# Patient Record
Sex: MALE | Race: OTHER
[De-identification: names, ages, dates, MRNs, and addresses within clinical notes are randomized per-mention and may not be internally consistent; named-entity substitution may affect disease eponyms.]

---

## 2019-09-08 ENCOUNTER — HOSPITAL ENCOUNTER (EMERGENCY)
Dept: HOSPITAL 61 - ER | Age: 39
Discharge: HOME | End: 2019-09-08
Payer: SELF-PAY

## 2019-09-08 VITALS — HEIGHT: 66 IN | BODY MASS INDEX: 24.11 KG/M2 | WEIGHT: 150 LBS

## 2019-09-08 VITALS — SYSTOLIC BLOOD PRESSURE: 142 MMHG | DIASTOLIC BLOOD PRESSURE: 93 MMHG

## 2019-09-08 DIAGNOSIS — F10.20: ICD-10-CM
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
## 2019-09-08 DIAGNOSIS — S62.114A: Primary | ICD-10-CM
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
## 2019-09-08 PROCEDURE — 73110 X-RAY EXAM OF WRIST: CPT

## 2019-09-08 PROCEDURE — 99284 EMERGENCY DEPT VISIT MOD MDM: CPT

## 2019-09-08 PROCEDURE — 29125 APPL SHORT ARM SPLINT STATIC: CPT

## 2019-09-08 NOTE — PHYS DOC
Past Medical History


Past Medical History:  No Pertinent History


Past Surgical History:  No Surgical History


Alcohol Use:  Heavy


Drug Use:  None





Adult General


Chief Complaint


Chief Complaint:  HAND PROBLEM





HPI


HPI





Patient is a 39  year old male that presents with right wrist pain has been 

ongoing since Friday. The patient states that on Friday he slammed his wrist in 

a door. Pain is 5 out of 10 in severity.





Review of Systems


Review of Systems





Constitutional: Denies fever or chills []


Eyes: Denies change in visual acuity, redness, or eye pain []


HENT: Denies nasal congestion or sore throat []


Respiratory: Denies cough or shortness of breath []


Cardiovascular: No additional information not addressed in HPI []


GI: Denies abdominal pain, nausea, vomiting, bloody stools or diarrhea []


: Denies dysuria or hematuria []


Musculoskeletal: Reports R wrist pain.


Integument: Denies rash or skin lesions []


Neurologic: Denies headache, focal weakness or sensory changes []


Endocrine: Denies polyuria or polydipsia []





Complete systems were reviewed and found to be within normal limits, except as 

documented in this note.





Allergies


Allergies





Allergies








Coded Allergies Type Severity Reaction Last Updated Verified


 


  No Known Drug Allergies    16 No











Physical Exam


Physical Exam





Constitutional: Well developed, well nourished, no acute distress, non-toxic a

ppearance. []


HENT: Normocephalic, atraumatic, bilateral external ears normal, oropharynx 

moist, no oral exudates, nose normal. []


Eyes: PERRLA, EOMI, conjunctiva normal, no discharge. [] 


Neck: Normal range of motion, no tenderness, supple, no stridor. [] 


Cardiovascular:Heart rate regular rhythm, no murmur []


Lungs & Thorax:  Bilateral breath sounds clear to auscultation []


Abdomen: Bowel sounds normal, soft, no tenderness, no masses, no pulsatile 

masses. [] 


Skin: Warm, dry, no erythema, no rash. [] 


Back: No tenderness, no CVA tenderness. [] 


Extremities: Edema and tenderness to R wrist. Has snuff box tenderness.


Neurologic: Alert and oriented X 3, normal motor function, normal sensory 

function, no focal deficits noted. []


Psychologic: Affect normal, judgement normal, mood normal. []





Current Patient Data


Vital Signs





                                   Vital Signs








  Date Time  Temp Pulse Resp B/P (MAP) Pulse Ox O2 Delivery O2 Flow Rate FiO2


 


19 17:00 99.1 63 16 142/93 (109) 96 Room Air  





 99.1       











EKG


EKG


[]





Radiology/Procedures


Radiology/Procedures


[][facility]


                    [address] [place] [address]


                                 [phone]


                                        


                                 IMAGING REPORT





                                     Signed





PATIENT: KAT COOPERACCOUNT: [id number]     MRN#: [id number]


: 1980           LOCATION: ER              AGE: 39


SEX: M                    EXAM DT: 19         ACCESSION#: [id number]


STATUS: REG ER            ORD. PHYSICIAN: KAILYN LAFLEUR


REASON: wrist trauma, PT INDICATED PAIN LOCATION IS MEADIAL ULNAR WRIST PAIN


PROCEDURE: WRIST 3V RIGHT





Exam: Right wrist 3 views


 


INDICATION: Trauma


 


TECHNIQUE: Frontal, lateral and oblique views of the right wrist


 


Comparisons: None


 


FINDINGS:


Mildly displaced ossific density in the posterior carpus seen best on 


lateral view. Subtle linear lucency at the ulnar styloid. Joint spaces are


well-maintained. Soft tissues are unremarkable. Bone mineralization is 


normal.


 


IMPRESSION:


1.  Findings likely representing minimally displaced triquetral fracture


2.  Subtle linear lucency at the ulnar styloid may represent nondisplaced 


fracture. Correlate with point tenderness.


 


Electronically signed by: Jag Cha MD (2019 5:45 PM) [facility]














DICTATED and SIGNED BY:     JAG CHA MD


DATE:     19 1179





Course & Med Decision Making


Course & Med Decision Making


Pertinent Labs and Imaging studies reviewed. (See chart for details)





Will get imaging.





Findings likely representing minimally displaced triquetral fracture


2.  Subtle linear lucency at the ulnar styloid may represent nondisplaced 


fracture. Correlate with point tenderness.





Will place in splint and have follow up with ortho.





Rosana Disclaimer


Dragon Disclaimer


This electronic medical record was generated, in whole or in part, using a voice

 recognition dictation system.





Departure


Departure


Impression:  


   Primary Impression:  


   Triquetral fracture


Disposition:  01 HOME, SELF-CARE


Condition:  STABLE


Referrals:  


NO PCP (PCP)








TOBY MOSS II, MD


Patient Instructions:  Wrist Fracture





Additional Instructions:  


Thank you for visiting [facility]. We appreciate you trusting us 

with your care. If any additional problems come up don't hesitate to return to 

visit us. Please follow up with your primary care provider so they can plan 

additional care if needed and know about the problem that you had. If symptoms 

worsen come back to the Emergency Department. Any concerning symptoms that start

 such as chest pain, shortness of air, weakness or numbness on one side of the 

body, running high fevers or any other concerning symptoms return to the ER.





Please fill your medications at any pharmacy and follow the prescription 

instructions.





Please follow up with orthopedic doctor this week.


Scripts


Hydrocodone/Apap 5-325 (NORCO 5-325 TABLET) 1 Each Tablet


1 TAB PO PRN Q6HRS PRN for PAIN for 3 Days, #10 TAB 0 Refills


   Prov: KAILYN LAFLEUR         19





Problem Qualifiers








   Primary Impression:  


   Triquetral fracture


   Encounter type:  initial encounter  Fracture type:  closed  Fracture alignme

   nt:  nondisplaced  Laterality:  right  Qualified Codes:  S62.114A - Nondispl

   aced fracture of triquetrum [cuneiform] bone, right wrist, initial encounter 

   for closed fracture








KAILYN LAFLEUR           Sep 8, 2019 17:38

## 2019-09-08 NOTE — RAD
Exam: Right wrist 3 views

 

INDICATION: Trauma

 

TECHNIQUE: Frontal, lateral and oblique views of the right wrist

 

Comparisons: None

 

FINDINGS:

Mildly displaced ossific density in the posterior carpus seen best on 

lateral view. Subtle linear lucency at the ulnar styloid. Joint spaces are

well-maintained. Soft tissues are unremarkable. Bone mineralization is 

normal.

 

IMPRESSION:

1.  Findings likely representing minimally displaced triquetral fracture

2.  Subtle linear lucency at the ulnar styloid may represent nondisplaced 

fracture. Correlate with point tenderness.

 

Electronically signed by: Jag Monae MD (9/8/2019 5:45 PM) [facility]

## 2019-10-13 ENCOUNTER — HOSPITAL ENCOUNTER (INPATIENT)
Dept: HOSPITAL 61 - ER | Age: 39
LOS: 4 days | Discharge: HOME | DRG: 550 | End: 2019-10-17
Attending: INTERNAL MEDICINE | Admitting: INTERNAL MEDICINE
Payer: SELF-PAY

## 2019-10-13 VITALS — SYSTOLIC BLOOD PRESSURE: 111 MMHG | DIASTOLIC BLOOD PRESSURE: 70 MMHG

## 2019-10-13 VITALS — HEIGHT: 65 IN | BODY MASS INDEX: 24.99 KG/M2 | WEIGHT: 150 LBS

## 2019-10-13 VITALS — DIASTOLIC BLOOD PRESSURE: 77 MMHG | SYSTOLIC BLOOD PRESSURE: 112 MMHG

## 2019-10-13 DIAGNOSIS — M00.9: Primary | ICD-10-CM

## 2019-10-13 DIAGNOSIS — M10.9: ICD-10-CM

## 2019-10-13 DIAGNOSIS — M11.262: ICD-10-CM

## 2019-10-13 DIAGNOSIS — Z83.3: ICD-10-CM

## 2019-10-13 LAB
ALBUMIN SERPL-MCNC: 4.3 G/DL (ref 3.4–5)
ALBUMIN/GLOB SERPL: 1.1 {RATIO} (ref 1–1.7)
ALP SERPL-CCNC: 110 U/L (ref 46–116)
ALT SERPL-CCNC: 82 U/L (ref 16–63)
ANION GAP SERPL CALC-SCNC: 14 MMOL/L (ref 6–14)
AST SERPL-CCNC: 47 U/L (ref 15–37)
BASOPHILS # BLD AUTO: 0.1 X10^3/UL (ref 0–0.2)
BASOPHILS NFR BLD: 1 % (ref 0–3)
BF MON %: 6 %
BF PMN %: 94 %
BF RBC COUNT: 520 /CMM
BF SOURCE: (no result)
BF WBC COUNT: (no result) /CMM
BILIRUB SERPL-MCNC: 0.9 MG/DL (ref 0.2–1)
BUN SERPL-MCNC: 8 MG/DL (ref 8–26)
BUN/CREAT SERPL: 8 (ref 6–20)
CALCIUM SERPL-MCNC: 9.1 MG/DL (ref 8.5–10.1)
CHLORIDE SERPL-SCNC: 104 MMOL/L (ref 98–107)
CLARITY SPEC: (no result)
CO2 SERPL-SCNC: 25 MMOL/L (ref 21–32)
COLOR FLD: YELLOW
CREAT SERPL-MCNC: 1 MG/DL (ref 0.7–1.3)
EOSINOPHIL NFR BLD: 0 % (ref 0–3)
EOSINOPHIL NFR BLD: 0 X10^3/UL (ref 0–0.7)
ERYTHROCYTE [DISTWIDTH] IN BLOOD BY AUTOMATED COUNT: 13 % (ref 11.5–14.5)
GFR SERPLBLD BASED ON 1.73 SQ M-ARVRAT: 83.6 ML/MIN
GLOBULIN SER-MCNC: 3.9 G/DL (ref 2.2–3.8)
GLUCOSE SERPL-MCNC: 110 MG/DL (ref 70–99)
HCT VFR BLD CALC: 44 % (ref 39–53)
HGB BLD-MCNC: 15.3 G/DL (ref 13–17.5)
LYMPHOCYTES # BLD: 1.3 X10^3/UL (ref 1–4.8)
LYMPHOCYTES NFR BLD AUTO: 11 % (ref 24–48)
MCH RBC QN AUTO: 32 PG (ref 25–35)
MCHC RBC AUTO-ENTMCNC: 35 G/DL (ref 31–37)
MCV RBC AUTO: 91 FL (ref 79–100)
MONO #: 1.1 X10^3/UL (ref 0–1.1)
MONOCYTES NFR BLD: 10 % (ref 0–9)
NEUT #: 8.9 X10^3/UL (ref 1.8–7.7)
NEUTROPHILS NFR BLD AUTO: 78 % (ref 31–73)
PLATELET # BLD AUTO: 250 X10^3/UL (ref 140–400)
POTASSIUM SERPL-SCNC: 3.8 MMOL/L (ref 3.5–5.1)
PROT SERPL-MCNC: 8.2 G/DL (ref 6.4–8.2)
RBC # BLD AUTO: 4.81 X10^6/UL (ref 4.3–5.7)
SODIUM SERPL-SCNC: 143 MMOL/L (ref 136–145)
WBC # BLD AUTO: 11.4 X10^3/UL (ref 4–11)

## 2019-10-13 PROCEDURE — 87071 CULTURE AEROBIC QUANT OTHER: CPT

## 2019-10-13 PROCEDURE — 82945 GLUCOSE OTHER FLUID: CPT

## 2019-10-13 PROCEDURE — A7015 AEROSOL MASK USED W NEBULIZE: HCPCS

## 2019-10-13 PROCEDURE — 80202 ASSAY OF VANCOMYCIN: CPT

## 2019-10-13 PROCEDURE — 96360 HYDRATION IV INFUSION INIT: CPT

## 2019-10-13 PROCEDURE — 87075 CULTR BACTERIA EXCEPT BLOOD: CPT

## 2019-10-13 PROCEDURE — 89050 BODY FLUID CELL COUNT: CPT

## 2019-10-13 PROCEDURE — 87040 BLOOD CULTURE FOR BACTERIA: CPT

## 2019-10-13 PROCEDURE — 0S9D4ZZ DRAINAGE OF LEFT KNEE JOINT, PERCUTANEOUS ENDOSCOPIC APPROACH: ICD-10-PCS | Performed by: ORTHOPAEDIC SURGERY

## 2019-10-13 PROCEDURE — 90471 IMMUNIZATION ADMIN: CPT

## 2019-10-13 PROCEDURE — 90686 IIV4 VACC NO PRSV 0.5 ML IM: CPT

## 2019-10-13 PROCEDURE — G0378 HOSPITAL OBSERVATION PER HR: HCPCS

## 2019-10-13 PROCEDURE — 96361 HYDRATE IV INFUSION ADD-ON: CPT

## 2019-10-13 PROCEDURE — 85025 COMPLETE CBC W/AUTO DIFF WBC: CPT

## 2019-10-13 PROCEDURE — C1782 MORCELLATOR: HCPCS

## 2019-10-13 PROCEDURE — 80053 COMPREHEN METABOLIC PANEL: CPT

## 2019-10-13 PROCEDURE — 36415 COLL VENOUS BLD VENIPUNCTURE: CPT

## 2019-10-13 PROCEDURE — 84550 ASSAY OF BLOOD/URIC ACID: CPT

## 2019-10-13 PROCEDURE — 83605 ASSAY OF LACTIC ACID: CPT

## 2019-10-13 PROCEDURE — 84157 ASSAY OF PROTEIN OTHER: CPT

## 2019-10-13 PROCEDURE — 73562 X-RAY EXAM OF KNEE 3: CPT

## 2019-10-13 PROCEDURE — 89060 EXAM SYNOVIAL FLUID CRYSTALS: CPT

## 2019-10-13 PROCEDURE — 82565 ASSAY OF CREATININE: CPT

## 2019-10-13 RX ADMIN — VANCOMYCIN HYDROCHLORIDE PRN EACH: 1 INJECTION, POWDER, LYOPHILIZED, FOR SOLUTION INTRAVENOUS at 20:01

## 2019-10-13 RX ADMIN — NAPROXEN SCH MG: 500 TABLET ORAL at 21:04

## 2019-10-13 NOTE — NUR
Pharmacy Vancomycin Dosing Note



S:Consulted to monitor and dose vancomycin started 10/13/19.



O:KAT COOPER is a 39 year old M with  R/O SEPTIC JOINT .



Height: 5 feet, 5 inches

Weight: 68.876312 kg

Ideal Body Weight: 61.50 

Adjusted Body Weight: 64.10 

Dosing Weight: Actual



Other Antibiotics: 



LABS:

Last BUN: 8 

Last Creatinine: 1.0 

Creatinine Clearance: 90 mL/min

Last WBC: 11.4 

Last Procalcitonin: 

Tmax (past 24 hours): 99.6 



Microbiology: 



I/O: 



Drug Levels:

Last  level:  on  at 

Last dose given 10/13/19 at 1947 



Vancomycin Dosing:

Loading Dose: 1750 mg x1

Dosing Weight: Actual

Target Trough: 15-20





A: Based on: Body weight and renal function





P: 1. After loading dose, start Vancomycin 1000 mg IV q12h

   2. Follow up Trough level on 10/15/19 at 0730 

   3. Pharmacy will continue to monitor, follow and adjust therapy as needed.

 

 FERMÍN BEARD RPH, 10/13/19 2002

## 2019-10-13 NOTE — PDOC1
History and Physical


Date of Admission


Date of Admission


DATE: 10/13/19 


TIME: 19:33





Identification/Chief Complaint


Chief Complaint


left knee pain upon waking up





Source


Source:  Caregiver, Chart review, Patient





History of Present Illness


History of Present Illness


 make but speaks some english, he woke up this AM with acute left knee 

pain, no trauma or injury, NO signif similar episodes, NO home meds to 

reconcile.


Xray shows fluid, MId level NP at ER tapped left knee joint and drained approx 

20 cc turbid yellow fluid and sent to lab with crystal etc to check, uric acid 

in serum 8,.7, no hx gout known, GIven colcrys 1.2 and empiric abx for "septic 

joint'





Past Medical History


Cardiovascular:  No pertinent hx


Pulmonary:  No pertinent hx


GI:  No pertinent hx


Heme/Onc:  No pertinent hx


Hepatobiliary:  No pertinent hx


Psych:  No pertinent hx


Rheumatologic:  No pertinent hx


Infectious disease:  No pertinent hx


ENT:  No pertinent hx


Renal/:  No pertinent hx


Endocrine:  No pertinent hx


Dermatology:  No pertinent hx





Past Surgical History


Past Surgical History:  No pertinent history





Family History


Family History:  No Significant





Social History


Smoke:  No


ALCOHOL:  none


Drugs:  None





Current Problem List


Problem List


Problems


Medical Problems:


(1) Knee pain, left


Status: Acute  











Current Medications


Current Medications





Current Medications


Morphine Sulfate (Morphine Sulfate) 4 mg 1X  ONCE IV  Last administered on 

10/13/19at 16:13;  Start 10/13/19 at 16:15;  Stop 10/13/19 at 16:16;  Status DC


Sodium Chloride 1,000 ml @  1,000 mls/hr 1X  ONCE IV  Last administered on 

10/13/19at 16:15;  Start 10/13/19 at 16:00;  Stop 10/13/19 at 16:59;  Status DC


Lidocaine HCl 20 ml 1X  ONCE IJ  Last administered on 10/13/19at 16:15;  Start 1

0/13/19 at 16:30;  Stop 10/13/19 at 16:31;  Status DC


Colchicine (Colcrys) 1.2 mg 1X  STAT PO  Last administered on 10/13/19at 17:58; 

Start 10/13/19 at 17:45;  Stop 10/13/19 at 17:51;  Status DC


Ketorolac Tromethamine (Toradol 30mg Vial) 30 mg 1X  ONCE IM  Last administered 

on 10/13/19at 17:58;  Start 10/13/19 at 17:45;  Stop 10/13/19 at 17:51;  Status 

DC


Vancomycin HCl (Vanco Per Pharmacy) 1 each PRN DAILY  PRN MC SEE COMMENTS;  

Start 10/13/19 at 18:15;  Status UNV


Naproxen (Naprosyn) 500 mg BID PO ;  Start 10/13/19 at 21:00


Acetaminophen/ Hydrocodone Bitart (Lortab 5/325) 1 tab PRN Q6HRS  PRN PO 

MODERATE-SEVERE PAIN;  Start 10/13/19 at 18:15


Acetaminophen (Tylenol) 500 mg PRN Q6HRS  PRN PO MILD PAIN / TEMP;  Start 

10/13/19 at 18:15


Ondansetron HCl (Zofran) 4 mg PRN Q6HRS  PRN IVP NAUSEA/VOMITING;  Start 

10/13/19 at 18:15


Zolpidem Tartrate (Ambien) 5 mg PRN QHS  PRN PO INSOMNIA, MAY REPEAT IN 1HR;  

Start 10/13/19 at 18:15


Morphine Sulfate (Morphine Sulfate) 2 mg PRN Q2HR  PRN IV PAIN;  Start 10/13/19 

at 18:15


Clonidine HCl (Catapres) 0.1 mg PRN Q1HR  PRN PO HYPERTENSION;  Start 10/13/19 

at 18:15


Ondansetron HCl (Zofran) 4 mg PRN Q8HRS  PRN IV NAUSEA/VOMITING;  Start 10/13/19

at 18:15;  Stop 10/14/19 at 18:14;  Status UNV


Morphine Sulfate (Morphine Sulfate) 2 mg PRN Q2HR  PRN IV PAIN;  Start 10/13/19 

at 18:15;  Stop 10/14/19 at 18:14;  Status UNV


Vancomycin HCl 1.75 gm/Sodium Chloride 500 ml @  250 mls/hr 1X  ONCE IV ;  Start

10/13/19 at 18:30;  Stop 10/13/19 at 20:29





Active Scripts


Active


Norco 5-325 Tablet (Acetaminophen/Hydrocodone Bitart) 1 Each Tablet 1 Tab PO PRN

Q6HRS PRN 3 Days





Allergies


Allergies:  


Coded Allergies:  


     No Known Drug Allergies (Unverified , 4/20/16)





ROS


Review of System


as per HPI< rest 14 pt neg





Physical Exam


General:  Alert, Oriented X3, Cooperative, No acute distress


HEENT:  Atraumatic, PERRLA


Lungs:  Clear to auscultation, Normal air movement


Heart:  S1S2, RRR, no thrills, no rubs, no gallops


Cardiovascular:  S1, S2


Abdomen:  Normal bowel sounds, Soft, No tenderness, No hepatosplenomegaly, No 

masses


Male Genitals Exam:  normal genitalia, normal prostate


Rectal Exam:  not examined


PELVIC:  Nml ext genitalia


Extremities:  No clubbing, No cyanosis, No edema, Normal pulses


Skin:  Other (left knee mild swelling and aspiration site, lateral knee 

identifiable, dressing on, full ROM)


Neuro:  Normal gait, Normal speech, Strength at 5/5 X4 ext, Normal tone, 

Sensation intact, Cranial nerves 3-12 NL, Reflexes 2+


Psych/Mental Status:  Mental status NL, Mood NL





Vitals


Vitals





Vital Signs








  Date Time  Temp Pulse Resp B/P (MAP) Pulse Ox O2 Delivery O2 Flow Rate FiO2


 


10/13/19 17:47  70 14 130/94 (106) 97 Room Air  


 


10/13/19 15:45 99.6       





 99.6       











Labs


Labs





Laboratory Tests








Test


 10/13/19


16:00 10/13/19


16:20


 


White Blood Count


 11.4 x10^3/uL


(4.0-11.0) 





 


Red Blood Count


 4.81 x10^6/uL


(4.30-5.70) 





 


Hemoglobin


 15.3 g/dL


(13.0-17.5) 





 


Hematocrit


 44.0 %


(39.0-53.0) 





 


Mean Corpuscular Volume 91 fL ()  


 


Mean Corpuscular Hemoglobin 32 pg (25-35)  


 


Mean Corpuscular Hemoglobin


Concent 35 g/dL


(31-37) 





 


Red Cell Distribution Width


 13.0 %


(11.5-14.5) 





 


Platelet Count


 250 x10^3/uL


(140-400) 





 


Neutrophils (%) (Auto) 78 % (31-73)  


 


Lymphocytes (%) (Auto) 11 % (24-48)  


 


Monocytes (%) (Auto) 10 % (0-9)  


 


Eosinophils (%) (Auto) 0 % (0-3)  


 


Basophils (%) (Auto) 1 % (0-3)  


 


Neutrophils # (Auto)


 8.9 x10^3/uL


(1.8-7.7) 





 


Lymphocytes # (Auto)


 1.3 x10^3/uL


(1.0-4.8) 





 


Monocytes # (Auto)


 1.1 x10^3/uL


(0.0-1.1) 





 


Eosinophils # (Auto)


 0.0 x10^3/uL


(0.0-0.7) 





 


Basophils # (Auto)


 0.1 x10^3/uL


(0.0-0.2) 





 


Sodium Level


 143 mmol/L


(136-145) 





 


Potassium Level


 3.8 mmol/L


(3.5-5.1) 





 


Chloride Level


 104 mmol/L


() 





 


Carbon Dioxide Level


 25 mmol/L


(21-32) 





 


Anion Gap 14 (6-14)  


 


Blood Urea Nitrogen 8 mg/dL (8-26)  


 


Creatinine


 1.0 mg/dL


(0.7-1.3) 





 


Estimated GFR


(Cockcroft-Gault) 83.6 


 





 


BUN/Creatinine Ratio 8 (6-20)  


 


Glucose Level


 110 mg/dL


(70-99) 





 


Lactic Acid Level


 1.3 mmol/L


(0.4-2.0) 





 


Uric Acid


 8.7 mg/dL


(3.5-7.2) 





 


Calcium Level


 9.1 mg/dL


(8.5-10.1) 





 


Total Bilirubin


 0.9 mg/dL


(0.2-1.0) 





 


Aspartate Amino Transf


(AST/SGOT) 47 U/L (15-37) 


 





 


Alanine Aminotransferase


(ALT/SGPT) 82 U/L (16-63) 


 





 


Alkaline Phosphatase


 110 U/L


() 





 


Total Protein


 8.2 g/dL


(6.4-8.2) 





 


Albumin


 4.3 g/dL


(3.4-5.0) 





 


Albumin/Globulin Ratio 1.1 (1.0-1.7)  


 


Body Fluid Source  Synovial 


 


Body Fluid Color  Yellow 


 


Body Fluid Clarity  Turbid 


 


Body Fluid Nucleated Cells


 


 09027 /cmm


(Not


 


Body Fluid Mononuclear WBCs


(%) 


 6 % 





 


Body Fluid Polymorphonuclear


Cells 


 94 % 





 


Body Fluid Total RBCs Counted


 


 520 /cmm (Not


Established)








Laboratory Tests








Test


 10/13/19


16:00 10/13/19


16:20


 


White Blood Count


 11.4 x10^3/uL


(4.0-11.0) 





 


Red Blood Count


 4.81 x10^6/uL


(4.30-5.70) 





 


Hemoglobin


 15.3 g/dL


(13.0-17.5) 





 


Hematocrit


 44.0 %


(39.0-53.0) 





 


Mean Corpuscular Volume 91 fL ()  


 


Mean Corpuscular Hemoglobin 32 pg (25-35)  


 


Mean Corpuscular Hemoglobin


Concent 35 g/dL


(31-37) 





 


Red Cell Distribution Width


 13.0 %


(11.5-14.5) 





 


Platelet Count


 250 x10^3/uL


(140-400) 





 


Neutrophils (%) (Auto) 78 % (31-73)  


 


Lymphocytes (%) (Auto) 11 % (24-48)  


 


Monocytes (%) (Auto) 10 % (0-9)  


 


Eosinophils (%) (Auto) 0 % (0-3)  


 


Basophils (%) (Auto) 1 % (0-3)  


 


Neutrophils # (Auto)


 8.9 x10^3/uL


(1.8-7.7) 





 


Lymphocytes # (Auto)


 1.3 x10^3/uL


(1.0-4.8) 





 


Monocytes # (Auto)


 1.1 x10^3/uL


(0.0-1.1) 





 


Eosinophils # (Auto)


 0.0 x10^3/uL


(0.0-0.7) 





 


Basophils # (Auto)


 0.1 x10^3/uL


(0.0-0.2) 





 


Sodium Level


 143 mmol/L


(136-145) 





 


Potassium Level


 3.8 mmol/L


(3.5-5.1) 





 


Chloride Level


 104 mmol/L


() 





 


Carbon Dioxide Level


 25 mmol/L


(21-32) 





 


Anion Gap 14 (6-14)  


 


Blood Urea Nitrogen 8 mg/dL (8-26)  


 


Creatinine


 1.0 mg/dL


(0.7-1.3) 





 


Estimated GFR


(Cockcroft-Gault) 83.6 


 





 


BUN/Creatinine Ratio 8 (6-20)  


 


Glucose Level


 110 mg/dL


(70-99) 





 


Lactic Acid Level


 1.3 mmol/L


(0.4-2.0) 





 


Uric Acid


 8.7 mg/dL


(3.5-7.2) 





 


Calcium Level


 9.1 mg/dL


(8.5-10.1) 





 


Total Bilirubin


 0.9 mg/dL


(0.2-1.0) 





 


Aspartate Amino Transf


(AST/SGOT) 47 U/L (15-37) 


 





 


Alanine Aminotransferase


(ALT/SGPT) 82 U/L (16-63) 


 





 


Alkaline Phosphatase


 110 U/L


() 





 


Total Protein


 8.2 g/dL


(6.4-8.2) 





 


Albumin


 4.3 g/dL


(3.4-5.0) 





 


Albumin/Globulin Ratio 1.1 (1.0-1.7)  


 


Body Fluid Source  Synovial 


 


Body Fluid Color  Yellow 


 


Body Fluid Clarity  Turbid 


 


Body Fluid Nucleated Cells


 


 11554 /cmm


(Not


 


Body Fluid Mononuclear WBCs


(%) 


 6 % 





 


Body Fluid Polymorphonuclear


Cells 


 94 % 





 


Body Fluid Total RBCs Counted


 


 520 /cmm (Not


Established)











VTE Prophylaxis Ordered


VTE Prophylaxis Devices:  Yes


VTE Pharmacological Prophylaxi:  Yes





Assessment/Plan


Assessment/Plan


r.o possible septic left knee, difftls include infectious vs inflammatory, CPPD 

vs gout 


  - await fluid cxs, crystals etc


  - I did start naproxen BID LUDIVINA to help with swelling and pain and can treat 

gout too


Elevated uric acid 8.7 - I did start colcrys GIVEN ACUTE nature - seen in gout, 

empiric abx - i did consult ID - vanc for now





Dw wife at bedside, no home meds to reconcile


Reg diet, non tele floor





FULL CODE











GUILLE WHYTE MD           Oct 13, 2019 19:38

## 2019-10-13 NOTE — RAD
3 view left knee

 

HISTORY: Left knee pain.

 

FINDINGS:

Joint spaces are intact. Small density overlying the medial joint 

compartment on the oblique view is likely overlapping tibial spine. No 

definite acute fracture is seen. No aggressive bone destruction. May be a 

suprapatellar joint effusion. Soft tissues are otherwise unremarkable.

 

IMPRESSION:

1. Probable joint effusion.

2. No definite acute fracture. Small bone density overlying the medial 

tibial plateau on the oblique view is likely overlying tibial spine.

3. Could consider outpatient MRI for further evaluation, particularly if 

symptoms do not improve.

 

Electronically signed by: Edison Maldonado MD (10/13/2019 4:56 PM) [facility]

## 2019-10-13 NOTE — PHYS DOC
Past Medical History


Past Medical History:  No Pertinent History


 (EDISON LAFLEUR)


Past Surgical History:  No Surgical History


 (EDISON LAFLEUR)


Alcohol Use:  Heavy


Drug Use:  None


 (EDISON LAFLEUR)


Attending Signature


I have participated in the care of this patient and I have reviewed and agree 

with all pertinent clinical information above including history, exam, and 

recommendations.





 (RAFITA GARBER MD)





Adult General


Chief Complaint


Chief Complaint:  LOWER EXTREMITY SWELLING





HPI


HPI





Patient is a 39  year old male that presents to the emergency department for 

left knee pain that started this morning when he woke up. He is unable to bend 

his left knee and complains of pain and swelling to the knee. The knee is also 

warm to touch. He rates his pain as 10 out of 10 in severity and sharp and 

throbbing. Denies any medical history, denies any trauma to the knee.


 (EDISON LAFLEUR)





Review of Systems


Review of Systems





Constitutional: Denies fever or chills []


Eyes: Denies change in visual acuity, redness, or eye pain []


HENT: Denies nasal congestion or sore throat []


Respiratory: Denies cough or shortness of breath []


Cardiovascular: No additional information not addressed in HPI []


GI: Denies abdominal pain, nausea, vomiting, bloody stools or diarrhea []


: Denies dysuria or hematuria []


Musculoskeletal: Reports L knee pain.


Integument: Denies rash or skin lesions []


Neurologic: Denies headache, focal weakness or sensory changes []


Endocrine: Denies polyuria or polydipsia []





Complete systems were reviewed and found to be within normal limits, except as 

documented in this note.


 (EDISON LAFLEUR)





Current Medications


Current Medications





Current Medications








 Medications


  (Trade)  Dose


 Ordered  Sig/Chantelle  Start Time


 Stop Time Status Last Admin


Dose Admin


 


 Colchicine


  (Colcrys)  1.2 mg  1X  STAT  10/13/19 17:45


 10/13/19 17:51 DC 10/13/19 17:58


1.2 MG


 


 Ketorolac


 Tromethamine


  (Toradol 30mg


 Vial)  30 mg  1X  ONCE  10/13/19 17:45


 10/13/19 17:51 DC 10/13/19 17:58


30 MG


 


 Lidocaine HCl  20 ml  1X  ONCE  10/13/19 16:30


 10/13/19 16:31 DC 10/13/19 16:15


20 ML


 


 Morphine Sulfate


  (Morphine


 Sulfate)  4 mg  1X  ONCE  10/13/19 16:15


 10/13/19 16:16 DC 10/13/19 16:13


4 MG


 


 Sodium Chloride  1,000 ml @ 


 1,000 mls/hr  1X  ONCE  10/13/19 16:00


 10/13/19 16:59 DC 10/13/19 16:15


1,000 MLS/HR





 (RAFITA GARBER MD)





Allergies


Allergies





Allergies








Coded Allergies Type Severity Reaction Last Updated Verified


 


  No Known Drug Allergies    16 No





 (RAFITA GARBER MD)





Physical Exam


Physical Exam





Constitutional: Well developed, well nourished, no acute distress, non-toxic 

appearance. []


HENT: Normocephalic, atraumatic, bilateral external ears normal, oropharynx 

moist, no oral exudates, nose normal. []


Eyes: PERRLA, EOMI, conjunctiva normal, no discharge. [] 


Neck: Normal range of motion, no tenderness, supple, no stridor. [] 


Cardiovascular:Heart rate regular rhythm, no murmur []


Lungs & Thorax:  Bilateral breath sounds clear to auscultation []


Abdomen: Bowel sounds normal, soft, no tenderness, no masses, no pulsatile 

masses. [] 


Skin: Warm, dry, no erythema, no rash. [] 


Back: No tenderness, no CVA tenderness. [] 


Extremities: Tenderness to left knee with edema, erythema, and warmth. Reduced 

range of motion.


Neurologic: Alert and oriented X 3, normal motor function, normal sensory fun

ction, no focal deficits noted. []


Psychologic: Affect normal, judgement normal, mood normal. []


 (EDISON LAFLEUR)





Current Patient Data


Vital Signs





                                   Vital Signs








  Date Time  Temp Pulse Resp B/P (MAP) Pulse Ox O2 Delivery O2 Flow Rate FiO2


 


10/13/19 17:47  70 14 130/94 (106) 97 Room Air  


 


10/13/19 15:45 99.6       





 99.6       





 (RAFITA GARBER MD)


Lab Values





                                Laboratory Tests








Test


 10/13/19


16:00 10/13/19


16:20


 


White Blood Count


 11.4 x10^3/uL


(4.0-11.0)  H 





 


Red Blood Count


 4.81 x10^6/uL


(4.30-5.70) 





 


Hemoglobin


 15.3 g/dL


(13.0-17.5) 





 


Hematocrit


 44.0 %


(39.0-53.0) 





 


Mean Corpuscular Volume


 91 fL ()


 





 


Mean Corpuscular Hemoglobin 32 pg (25-35)   


 


Mean Corpuscular Hemoglobin


Concent 35 g/dL


(31-37) 





 


Red Cell Distribution Width


 13.0 %


(11.5-14.5) 





 


Platelet Count


 250 x10^3/uL


(140-400) 





 


Neutrophils (%) (Auto) 78 % (31-73)  H 


 


Lymphocytes (%) (Auto) 11 % (24-48)  L 


 


Monocytes (%) (Auto) 10 % (0-9)  H 


 


Eosinophils (%) (Auto) 0 % (0-3)   


 


Basophils (%) (Auto) 1 % (0-3)   


 


Neutrophils # (Auto)


 8.9 x10^3/uL


(1.8-7.7)  H 





 


Lymphocytes # (Auto)


 1.3 x10^3/uL


(1.0-4.8) 





 


Monocytes # (Auto)


 1.1 x10^3/uL


(0.0-1.1) 





 


Eosinophils # (Auto)


 0.0 x10^3/uL


(0.0-0.7) 





 


Basophils # (Auto)


 0.1 x10^3/uL


(0.0-0.2) 





 


Sodium Level


 143 mmol/L


(136-145) 





 


Potassium Level


 3.8 mmol/L


(3.5-5.1) 





 


Chloride Level


 104 mmol/L


() 





 


Carbon Dioxide Level


 25 mmol/L


(21-32) 





 


Anion Gap 14 (6-14)   


 


Blood Urea Nitrogen


 8 mg/dL (8-26)


 





 


Creatinine


 1.0 mg/dL


(0.7-1.3) 





 


Estimated GFR


(Cockcroft-Gault) 83.6  


 





 


BUN/Creatinine Ratio 8 (6-20)   


 


Glucose Level


 110 mg/dL


(70-99)  H 





 


Lactic Acid Level


 1.3 mmol/L


(0.4-2.0) 





 


Uric Acid


 8.7 mg/dL


(3.5-7.2)  H 





 


Calcium Level


 9.1 mg/dL


(8.5-10.1) 





 


Total Bilirubin


 0.9 mg/dL


(0.2-1.0) 





 


Aspartate Amino Transferase


(AST) 47 U/L (15-37)


H 





 


Alanine Aminotransferase (ALT)


 82 U/L (16-63)


H 





 


Alkaline Phosphatase


 110 U/L


() 





 


Total Protein


 8.2 g/dL


(6.4-8.2) 





 


Albumin


 4.3 g/dL


(3.4-5.0) 





 


Albumin/Globulin Ratio 1.1 (1.0-1.7)   


 


Body Fluid Source  Synovial  


 


Body Fluid Color  Yellow  


 


Body Fluid Clarity  Turbid  


 


Body Fluid Nucleated Cells


 


 48181 /cmm


(Not


 


Body Fluid Mononuclear WBCs


(%) 


 6 %  





 


Body Fluid Polymorphonuclear


Cells 


 94 %  





 


Body Fluid Total RBCs Counted


 


 520 /cmm (Not


Established)





                                Laboratory Tests


10/13/19 16:00








                                Laboratory Tests


10/13/19 16:00








 (RAFITA GARBER MD)


Lab Values





                                Laboratory Tests








Test


 10/13/19


16:00 10/13/19


16:20


 


White Blood Count


 11.4 x10^3/uL


(4.0-11.0)  H 





 


Red Blood Count


 4.81 x10^6/uL


(4.30-5.70) 





 


Hemoglobin


 15.3 g/dL


(13.0-17.5) 





 


Hematocrit


 44.0 %


(39.0-53.0) 





 


Mean Corpuscular Volume


 91 fL ()


 





 


Mean Corpuscular Hemoglobin 32 pg (25-35)   


 


Mean Corpuscular Hemoglobin


Concent 35 g/dL


(31-37) 





 


Red Cell Distribution Width


 13.0 %


(11.5-14.5) 





 


Platelet Count


 250 x10^3/uL


(140-400) 





 


Neutrophils (%) (Auto) 78 % (31-73)  H 


 


Lymphocytes (%) (Auto) 11 % (24-48)  L 


 


Monocytes (%) (Auto) 10 % (0-9)  H 


 


Eosinophils (%) (Auto) 0 % (0-3)   


 


Basophils (%) (Auto) 1 % (0-3)   


 


Neutrophils # (Auto)


 8.9 x10^3/uL


(1.8-7.7)  H 





 


Lymphocytes # (Auto)


 1.3 x10^3/uL


(1.0-4.8) 





 


Monocytes # (Auto)


 1.1 x10^3/uL


(0.0-1.1) 





 


Eosinophils # (Auto)


 0.0 x10^3/uL


(0.0-0.7) 





 


Basophils # (Auto)


 0.1 x10^3/uL


(0.0-0.2) 





 


Sodium Level


 143 mmol/L


(136-145) 





 


Potassium Level


 3.8 mmol/L


(3.5-5.1) 





 


Chloride Level


 104 mmol/L


() 





 


Carbon Dioxide Level


 25 mmol/L


(21-32) 





 


Anion Gap 14 (6-14)   


 


Blood Urea Nitrogen


 8 mg/dL (8-26)


 





 


Creatinine


 1.0 mg/dL


(0.7-1.3) 





 


Estimated GFR


(Cockcroft-Gault) 83.6  


 





 


BUN/Creatinine Ratio 8 (6-20)   


 


Glucose Level


 110 mg/dL


(70-99)  H 





 


Lactic Acid Level


 1.3 mmol/L


(0.4-2.0) 





 


Uric Acid


 8.7 mg/dL


(3.5-7.2)  H 





 


Calcium Level


 9.1 mg/dL


(8.5-10.1) 





 


Total Bilirubin


 0.9 mg/dL


(0.2-1.0) 





 


Aspartate Amino Transferase


(AST) 47 U/L (15-37)


H 





 


Alanine Aminotransferase (ALT)


 82 U/L (16-63)


H 





 


Alkaline Phosphatase


 110 U/L


() 





 


Total Protein


 8.2 g/dL


(6.4-8.2) 





 


Albumin


 4.3 g/dL


(3.4-5.0) 





 


Albumin/Globulin Ratio 1.1 (1.0-1.7)   


 


Body Fluid Source  Synovial  


 


Body Fluid Color  Yellow  


 


Body Fluid Clarity  Turbid  


 


Body Fluid Nucleated Cells


 


 41425 /cmm


(Not


 


Body Fluid Mononuclear WBCs


(%) 


 6 %  





 


Body Fluid Polymorphonuclear


Cells 


 94 %  





 


Body Fluid Total RBCs Counted


 


 520 /cmm (Not


Established)





                                Laboratory Tests


10/13/19 16:00








                                Laboratory Tests


10/13/19 16:00








 (EDISON LAFLEUR)





EKG


EKG


[]


 (EDISON LAFLEUR)





Radiology/Procedures


Radiology/Procedures


Performed a Knee aspiration to the L knee with Dr. Garber at bedside. Washed 

knee with Iodine and then injected 3 mL of 2% lidocaine. Aspirated lateral side 

22 mL of cloudy yellow fluid. No complications. Sent to labs for cultures.























                    [address] [place] [address] [phone]


                                        


                                 IMAGING REPORT





                                     Signed





PATIENT: FREDRICK COOPERTALATACCOUNT: [id number]     MRN#: [id number]


: 1980           LOCATION: ER              AGE: 39


SEX: M                    EXAM DT: 10/13/19         ACCESSION#: [id number]


STATUS: REG ER            ORD. PHYSICIAN: EDISON LAFLEUR


REASON: l knee pain, edema


PROCEDURE: KNEE LEFT 3V





3 view left knee


 


HISTORY: Left knee pain.


 


FINDINGS:


Joint spaces are intact. Small density overlying the medial joint 


compartment on the oblique view is likely overlapping tibial spine. No 


definite acute fracture is seen. No aggressive bone destruction. May be a 


suprapatellar joint effusion. Soft tissues are otherwise unremarkable.


 


IMPRESSION:


1. Probable joint effusion.


2. No definite acute fracture. Small bone density overlying the medial 


tibial plateau on the oblique view is likely overlying tibial spine.


3. Could consider outpatient MRI for further evaluation, particularly if 


symptoms do not improve.


 


Electronically signed by: Edison Maldonado MD (10/13/2019 4:56 PM) [facility]














DICTATED and SIGNED BY:     EDISON MALDONADO MD


DATE:     10/13/19 1656











 (EDISON LAFLEUR)





Course & Med Decision Making


Course & Med Decision Making


Pertinent Labs and Imaging studies reviewed. (See chart for details)





Will get X-ray, labs, and give supportive care.





Will also perform an aspiration of the left knee to send off for cultures.





Cells of 55,500 with polymorph # of 94%. Discussed with Dr. Bermeo who requests 

admission to rule out septic joint. Will also order Vanc. Uric Acid is 8.7. 

Discussed with Dr. Ontiveros who accepts admission ().


 (EDISON LAFLEUR)





Dragon Disclaimer


Dragon Disclaimer


This electronic medical record was generated, in whole or in part, using a voice

 recognition dictation system.


 (EDISON LAFLEUR)





Departure


Departure


Impression:  


   Primary Impression:  


   Knee pain, left


Disposition:   ADMITTED AS INPATIENT


Admitting Physician:  RAQUEL


 (EDISON LAFLEUR)


Condition:  STABLE


Referrals:  


NO PCP (PCP)





Problem Qualifiers








   Primary Impression:  


   Knee pain, left


   Chronicity:  acute  Qualified Codes:  M25.562 - Pain in left knee








EDISON LAFLEUR          Oct 13, 2019 15:58


RAFITA GARBER MD              Oct 14, 2019 06:52

## 2019-10-14 VITALS — DIASTOLIC BLOOD PRESSURE: 66 MMHG | SYSTOLIC BLOOD PRESSURE: 97 MMHG

## 2019-10-14 VITALS — SYSTOLIC BLOOD PRESSURE: 127 MMHG | DIASTOLIC BLOOD PRESSURE: 79 MMHG

## 2019-10-14 VITALS — DIASTOLIC BLOOD PRESSURE: 55 MMHG | SYSTOLIC BLOOD PRESSURE: 104 MMHG

## 2019-10-14 VITALS — DIASTOLIC BLOOD PRESSURE: 72 MMHG | SYSTOLIC BLOOD PRESSURE: 111 MMHG

## 2019-10-14 VITALS — SYSTOLIC BLOOD PRESSURE: 106 MMHG | DIASTOLIC BLOOD PRESSURE: 63 MMHG

## 2019-10-14 VITALS — SYSTOLIC BLOOD PRESSURE: 107 MMHG | DIASTOLIC BLOOD PRESSURE: 69 MMHG

## 2019-10-14 LAB
CREAT SERPL-MCNC: 0.9 MG/DL (ref 0.7–1.3)
GFR SERPLBLD BASED ON 1.73 SQ M-ARVRAT: 94.4 ML/MIN

## 2019-10-14 RX ADMIN — VANCOMYCIN HYDROCHLORIDE SCH MLS/HR: 1 INJECTION, POWDER, FOR SOLUTION INTRAVENOUS at 10:04

## 2019-10-14 RX ADMIN — CEFEPIME SCH GM: 1 INJECTION, POWDER, FOR SOLUTION INTRAMUSCULAR; INTRAVENOUS at 10:04

## 2019-10-14 RX ADMIN — NAPROXEN SCH MG: 500 TABLET ORAL at 09:00

## 2019-10-14 RX ADMIN — VANCOMYCIN HYDROCHLORIDE PRN EACH: 1 INJECTION, POWDER, LYOPHILIZED, FOR SOLUTION INTRAVENOUS at 11:17

## 2019-10-14 RX ADMIN — CEFEPIME SCH GM: 1 INJECTION, POWDER, FOR SOLUTION INTRAMUSCULAR; INTRAVENOUS at 21:15

## 2019-10-14 RX ADMIN — VANCOMYCIN HYDROCHLORIDE SCH MLS/HR: 1 INJECTION, POWDER, FOR SOLUTION INTRAVENOUS at 20:03

## 2019-10-14 RX ADMIN — NAPROXEN SCH MG: 500 TABLET ORAL at 21:15

## 2019-10-14 RX ADMIN — COLCHICINE SCH MG: 0.6 TABLET, FILM COATED ORAL at 21:15

## 2019-10-14 NOTE — CONS
DATE OF CONSULTATION:  



REQUESTING PHYSICIAN:  Dr. Ontiveros.



REASON FOR CONSULTATION:  Left knee inflammation, possible infection.



HISTORY OF PRESENT ILLNESS:  This is a 39-year-old  gentleman who came

in who woke up with 1-day history of left knee swelling and pain.  The patient

denies any trauma.  Denies any twisting, turning, any injury.  The patient also

denies similar episode in the past.  The patient did not have any fever other

than here he was noted to have 99.6, but denied any fever at home.  Denied any

nausea, vomiting, diarrhea, chest pain, shortness of breath, abdominal pain,

urinary symptoms or bowel symptoms.  The patient had a knee aspiration done in

the ER, which is showing 55,000 WBC.  Culture is pending.



The patient is started on vancomycin and the pain medication.  The patient is

feeling better.  Crystal analysis is also pending.  The patient has no new

complaints.



PAST MEDICAL HISTORY:  Essentially unremarkable.  He denies any history of gout

or pseudogout or similar episode.



SOCIAL HISTORY:  Negative for smoking, occasional alcohol use, no drug use.



ALLERGIES:  No known drug allergies.



CURRENT MEDICATIONS:  Reviewed.  The patient is on vancomycin.



REVIEW OF SYSTEMS:  As per HPI, all other systems reviewed are negative.



PHYSICAL EXAMINATION:

GENERAL:  Alert, oriented gentleman, not in distress.

VITAL SIGNS:  Stable with a T-max of 99.6.  Rest of vital signs stable.

HEENT:  NAD.

NECK:  Supple, no JVP, no lymphadenopathy.

LUNGS:  Clear.

HEART:  S1, S2 regular.

ABDOMEN:  Benign.

EXTREMITIES:  No edema, cyanosis.

SKIN:  Unremarkable.  The patient is neurologically intact.  In particular

joint, left knee is swollen with some mild tenderness present.  There is no

erythema.  There is no open wound.

NEUROLOGIC:  The patient is neurologically alert, awake and appropriate.  No

focal neurologic deficit.



LABORATORY DATA:  White count is 11.4, hemoglobin 15.3, platelets are normal. 

BUN and creatinine is normal.  His lactic acid 1.3.  Uric acid 8.7.  AST 47, ALT

82.  The fluid was yellow, turbid, 55,500 nucleated cells, 94% poly, 520 RBC. 

X-ray was unremarkable other than fluid.



IMPRESSION:

1.  Monoarticular inflammation of the left knee, possibility of infection is

very high with mild cell count number, although crystal analysis pending and

culture is pending.

2.  Slight fever.

3.  Leukocytosis.



RECOMMENDATIONS:  Recommend continue vancomycin, add cefepime, supportive care. 

We will wait for the culture and crystal analysis and we will continue to

follow.



Thank you very much, Dr. Ontiveros, for giving me the opportunity to participate

in this patient's care.

 



______________________________

OMERO URRUTIA MD



DR:  JESU/emeli  JOB#:  [id number] / [id number]

DD:  10/14/2019 08:11  DT:  10/14/2019 09:17

## 2019-10-14 NOTE — PDOC4
Operative Note


Operative Note


Date of Procedure:   October 14, 2019


Pre-Op Diagnosis:   M01.X62 direct infection of left knee in infectious and 

parasitic diseases classified elsewhere


Post-Op Diagnosis:   


   M11.262  Other chondrocalcinosis, left knee


   M01.X62 direct infection of left knee in infectious and parasitic diseases 

classified elsewhere


Procedure:      CPT 56049 arthroscopy, knee, surgical; for infection, lavage and

drainage


Surgeon:       Gio Bermeo MD


Anesthesia:        General


EBL:       20 mL


Specimens Obtained:     cultures aerobe and anaerobe, and crystal analysis


Complications:        none


Drains:       none


Tourniquet time:    26 Minutes


Tourniquet pressure:    300 mm Hg





Indications for Procedure: The patient is a 39 year old with a probable infected

septic left knee joint.  I recommended left knee arthroscopic lavage and 

drainage for presumed infection. We discussed the potential risks of recurrent 

infection, bleeding, blood clots, neurovascular injury, or other potential 

surgical or anesthetic complications. The patient and I discussed the risks, 

benefits and alternatives of surgery. All of the patients questions were 

answered and he desired to proceed.





Procedure in Detail: The patient was identified in the preoperative holding 

area.  The correct left lower extremity was marked by me.  The patient was taken

to the operating room where anesthesia was used.  The patient was positioned 

supine on the operating table. Preoperative antibiotics were not given because 

the patient remains on scheduled antibiotics..  A timeout procedure was 

performed.  A tourniquet was placed on the upper left  thigh. The left lower 

limb was prepared with sterile solution and sterile drapes were applied. An 

impervious stockinette was used over the lower leg.





The limb was exsanguinated with an Esmarch bandage. The tourniquet was inflated.

Lateral and medial arthroscopy portals were established. Cloudy fluid was noted 

at placement of the intra-articular trocar. Examination of the knee joint was 

performed, including the medial tibiofemoral joint, the intercondylar notch, the

lateral tibiofemoral joint, suprapatellar pouch, medial gutter, and the lateral 

gutter. There were white crystals throughout all of the joint and on all of the 

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 

The differential includes gout. I cannot rule out infection based on this 

appearance, a concurrent infection along with a crystalline arthropathy is pos

sible.  Copious saline irrigation was used. 6Lof saline was used. The shaver was

used to retrieve the fluid and to perform associated synovectomy. After 

systematic and examination of the knee joint and copious irrigation the knee was

drained of fluid. The portals were closed with 3-0 Prolene sutures. Local 

anesthetic with epinephrine was injected into the knee joint.   No drain was 

used.A sterile dressing was applied. The tourniquet was released. Needle and 

sponge counts were correct. There were no apparent complications.





I recommend continuation of antibiotics until cultures are negative. I will 

start colchicine for presumed chondrocalcinosis.











GIO BERMEO MD                 Oct 14, 2019 13:11

## 2019-10-14 NOTE — PDOC2
CONSULT


Date of Consult


Date of Consult


DATE: 10/14/19 


TIME: 08:35





Reason for Consult


Reason for Consult:


left knee septic joint vs gout





Identification/Chief Complaint


Chief Complaint


left knee pain spontaneous joint swelling





Source


Source:  Chart review





History of Present Illness


Reason for Visit:


I spoke to ER last night who had aspirated cloudy fluid with 55,000 WBCs and 95%

polys.  Possible septic joint, although uric acid also high, so acute gout flare

also in the differential. I recommended admission with empiric antibiotics and 

plan for arthroscopic lavage.





I spoke to the patient today via an . He works in [employer]. No injury 

to the knee. Spontaneous swelling yesterday. Low-grade temperature. Severe knee 

pain, inability to walk





Past Medical History


Cardiovascular:  No pertinent hx


Pulmonary:  No pertinent hx


GI:  No pertinent hx


Heme/Onc:  No pertinent hx


Hepatobiliary:  No pertinent hx


Psych:  No pertinent hx


Rheumatologic:  No pertinent hx


Infectious disease:  No pertinent hx


ENT:  No pertinent hx


Renal/:  No pertinent hx


Endocrine:  No pertinent hx


Dermatology:  No pertinent hx





Past Surgical History


Past Surgical History


He denies prior surgery via the 


Past Surgical History:  No pertinent history





Family History


Family History


Family history of diabetes per the 


Family History:  Diabetes





Social History


Social History


He lives with his wife and 1 child. He works in [employer]


No


ALCOHOL:  none


Drugs:  None


Lives:  with Family





Current Problem List


Problem List


Problems


Medical Problems:


(1) Knee pain, left


Status: Acute  











Current Medications


Current Medications





Current Medications


Morphine Sulfate (Morphine Sulfate) 4 mg 1X  ONCE IV  Last administered on 

10/13/19at 16:13;  Start 10/13/19 at 16:15;  Stop 10/13/19 at 16:16;  Status DC


Sodium Chloride 1,000 ml @  1,000 mls/hr 1X  ONCE IV  Last administered on 

10/13/19at 16:15;  Start 10/13/19 at 16:00;  Stop 10/13/19 at 16:59;  Status DC


Lidocaine HCl 20 ml 1X  ONCE IJ  Last administered on 10/13/19at 16:15;  Start 

10/13/19 at 16:30;  Stop 10/13/19 at 16:31;  Status DC


Colchicine (Colcrys) 1.2 mg 1X  STAT PO  Last administered on 10/13/19at 17:58; 

Start 10/13/19 at 17:45;  Stop 10/13/19 at 17:51;  Status DC


Ketorolac Tromethamine (Toradol 30mg Vial) 30 mg 1X  ONCE IM  Last administered 

on 10/13/19at 17:58;  Start 10/13/19 at 17:45;  Stop 10/13/19 at 17:51;  Status 

DC


Vancomycin HCl (Vanco Per Pharmacy) 1 each PRN DAILY  PRN MC SEE COMMENTS Last 

administered on 10/13/19at 20:01;  Start 10/13/19 at 18:15


Naproxen (Naprosyn) 500 mg BID PO  Last administered on 10/13/19at 21:04;  Start

10/13/19 at 21:00


Acetaminophen/ Hydrocodone Bitart (Lortab 5/325) 1 tab PRN Q6HRS  PRN PO 

MODERATE-SEVERE PAIN;  Start 10/13/19 at 18:15


Acetaminophen (Tylenol) 500 mg PRN Q6HRS  PRN PO MILD PAIN / TEMP;  Start 

10/13/19 at 18:15


Ondansetron HCl (Zofran) 4 mg PRN Q6HRS  PRN IVP NAUSEA/VOMITING;  Start 

10/13/19 at 18:15


Zolpidem Tartrate (Ambien) 5 mg PRN QHS  PRN PO INSOMNIA, MAY REPEAT IN 1HR;  

Start 10/13/19 at 18:15


Morphine Sulfate (Morphine Sulfate) 2 mg PRN Q2HR  PRN IV PAIN Last administered

on 10/13/19at 21:05;  Start 10/13/19 at 18:15


Clonidine HCl (Catapres) 0.1 mg PRN Q1HR  PRN PO HYPERTENSION;  Start 10/13/19 

at 18:15


Ondansetron HCl (Zofran) 4 mg PRN Q8HRS  PRN IV NAUSEA/VOMITING;  Start 10/13/19

at 18:15;  Stop 10/14/19 at 18:14;  Status UNV


Morphine Sulfate (Morphine Sulfate) 2 mg PRN Q2HR  PRN IV PAIN;  Start 10/13/19 

at 18:15;  Stop 10/14/19 at 18:14;  Status UNV


Vancomycin HCl 1.75 gm/Sodium Chloride 500 ml @  250 mls/hr 1X  ONCE IV  Last 

administered on 10/13/19at 19:47;  Start 10/13/19 at 18:30;  Stop 10/13/19 at 

20:29;  Status DC


Colchicine (Colcrys) 0.6 mg DAILY PO ;  Start 10/14/19 at 09:00


Vancomycin HCl 1 gm/Sodium Chloride 250 ml @  250 mls/hr Q12H IV ;  Start 

10/14/19 at 08:00


Vancomycin HCl (Vancomycin Trough Level) 1 each 1X  ONCE MC ;  Start 10/15/19 at

07:30;  Stop 10/15/19 at 07:31


Diphenhydramine HCl (Benadryl) 25 mg PRN Q6HRS  PRN PO ITCHING Last administered

on 10/13/19at 22:40;  Start 10/13/19 at 22:30


Influenza Virus Vaccine Quadrival (Afluria Quad 2019-20 (3yr Up) Syringe) 0.5 ml

ONCE ONCE VAX IM ;  Start 10/14/19 at 09:00;  Stop 10/14/19 at 09:01


Cefepime HCl (Maxipime) 1 gm Q12HR IVP ;  Start 10/14/19 at 09:00





Active Scripts


Active


Norco 5-325 Tablet (Acetaminophen/Hydrocodone Bitart) 1 Each Tablet 1 Tab PO PRN

Q6HRS PRN 3 Days





Allergies


Allergies:  


Coded Allergies:  


     No Known Drug Allergies (Unverified , 16)





ROS


Hematological and Lymphatic:  No: Blood Clots


Respiratory:  No: Cough, SOB with excertion


Cardiovascular:  No Chest Pain


Gastrointestinal:  No Nausea, No Vomiting, No Diarrhea


Musculoskeletal:  Yes Joint Pain, Yes Joint Swelling





Physical Exam


General:  Alert, Cooperative


HEENT:  Atraumatic


Lungs:  Normal air movement


Heart:  Regular rate


Abdomen:  Soft


Extremities:  Other (there is a left knee effusion and slight warmth. There was 

no traumatic wound, there is slight blood from the aspiration site on the 

lateral knee. There is not any focal prepatellar bursitis or bursal swelling, 

this all seems to be joint swelling/joint effusion.  The knee is tender and 

slightly warm but not erythematous. Distal light touch sensation is intact. 

Minimal motion to the knee, any motion is painful. Sensory and motor function 

seem intact but motor function is limited by pain)


Skin:  No breakdown, No significant lesion


Neuro:  Normal speech, Sensation intact





Vitals


VITALS





Vital Signs








  Date Time  Temp Pulse Resp B/P (MAP) Pulse Ox O2 Delivery O2 Flow Rate FiO2


 


10/14/19 03:00 98.5 64 18 97/66 (76) 97 Room Air  





 98.5       











Labs


Labs





Laboratory Tests








Test


 10/13/19


16:00 10/13/19


16:20 10/14/19


06:55


 


White Blood Count


 11.4 x10^3/uL


(4.0-11.0) 


 





 


Red Blood Count


 4.81 x10^6/uL


(4.30-5.70) 


 





 


Hemoglobin


 15.3 g/dL


(13.0-17.5) 


 





 


Hematocrit


 44.0 %


(39.0-53.0) 


 





 


Mean Corpuscular Volume 91 fL ()   


 


Mean Corpuscular Hemoglobin 32 pg (25-35)   


 


Mean Corpuscular Hemoglobin


Concent 35 g/dL


(31-37) 


 





 


Red Cell Distribution Width


 13.0 %


(11.5-14.5) 


 





 


Platelet Count


 250 x10^3/uL


(140-400) 


 





 


Neutrophils (%) (Auto) 78 % (31-73)   


 


Lymphocytes (%) (Auto) 11 % (24-48)   


 


Monocytes (%) (Auto) 10 % (0-9)   


 


Eosinophils (%) (Auto) 0 % (0-3)   


 


Basophils (%) (Auto) 1 % (0-3)   


 


Neutrophils # (Auto)


 8.9 x10^3/uL


(1.8-7.7) 


 





 


Lymphocytes # (Auto)


 1.3 x10^3/uL


(1.0-4.8) 


 





 


Monocytes # (Auto)


 1.1 x10^3/uL


(0.0-1.1) 


 





 


Eosinophils # (Auto)


 0.0 x10^3/uL


(0.0-0.7) 


 





 


Basophils # (Auto)


 0.1 x10^3/uL


(0.0-0.2) 


 





 


Sodium Level


 143 mmol/L


(136-145) 


 





 


Potassium Level


 3.8 mmol/L


(3.5-5.1) 


 





 


Chloride Level


 104 mmol/L


() 


 





 


Carbon Dioxide Level


 25 mmol/L


(21-32) 


 





 


Anion Gap 14 (6-14)   


 


Blood Urea Nitrogen 8 mg/dL (8-26)   


 


Creatinine


 1.0 mg/dL


(0.7-1.3) 


 0.9 mg/dL


(0.7-1.3)


 


Estimated GFR


(Cockcroft-Gault) 83.6 


 


 94.4 





 


BUN/Creatinine Ratio 8 (6-20)   


 


Glucose Level


 110 mg/dL


(70-99) 


 





 


Lactic Acid Level


 1.3 mmol/L


(0.4-2.0) 


 





 


Uric Acid


 8.7 mg/dL


(3.5-7.2) 


 





 


Calcium Level


 9.1 mg/dL


(8.5-10.1) 


 





 


Total Bilirubin


 0.9 mg/dL


(0.2-1.0) 


 





 


Aspartate Amino Transf


(AST/SGOT) 47 U/L (15-37) 


 


 





 


Alanine Aminotransferase


(ALT/SGPT) 82 U/L (16-63) 


 


 





 


Alkaline Phosphatase


 110 U/L


() 


 





 


Total Protein


 8.2 g/dL


(6.4-8.2) 


 





 


Albumin


 4.3 g/dL


(3.4-5.0) 


 





 


Albumin/Globulin Ratio 1.1 (1.0-1.7)   


 


Body Fluid Source  Synovial  


 


Body Fluid Color  Yellow  


 


Body Fluid Clarity  Turbid  


 


Body Fluid Nucleated Cells


 


 56693 /cmm


(Not 





 


Body Fluid Mononuclear WBCs


(%) 


 6 % 


 





 


Body Fluid Polymorphonuclear


Cells 


 94 % 


 





 


Body Fluid Total RBCs Counted


 


 520 /cmm (Not


Established) 











Laboratory Tests








Test


 10/13/19


16:00 10/13/19


16:20 10/14/19


06:55


 


White Blood Count


 11.4 x10^3/uL


(4.0-11.0) 


 





 


Red Blood Count


 4.81 x10^6/uL


(4.30-5.70) 


 





 


Hemoglobin


 15.3 g/dL


(13.0-17.5) 


 





 


Hematocrit


 44.0 %


(39.0-53.0) 


 





 


Mean Corpuscular Volume 91 fL ()   


 


Mean Corpuscular Hemoglobin 32 pg (25-35)   


 


Mean Corpuscular Hemoglobin


Concent 35 g/dL


(31-37) 


 





 


Red Cell Distribution Width


 13.0 %


(11.5-14.5) 


 





 


Platelet Count


 250 x10^3/uL


(140-400) 


 





 


Neutrophils (%) (Auto) 78 % (31-73)   


 


Lymphocytes (%) (Auto) 11 % (24-48)   


 


Monocytes (%) (Auto) 10 % (0-9)   


 


Eosinophils (%) (Auto) 0 % (0-3)   


 


Basophils (%) (Auto) 1 % (0-3)   


 


Neutrophils # (Auto)


 8.9 x10^3/uL


(1.8-7.7) 


 





 


Lymphocytes # (Auto)


 1.3 x10^3/uL


(1.0-4.8) 


 





 


Monocytes # (Auto)


 1.1 x10^3/uL


(0.0-1.1) 


 





 


Eosinophils # (Auto)


 0.0 x10^3/uL


(0.0-0.7) 


 





 


Basophils # (Auto)


 0.1 x10^3/uL


(0.0-0.2) 


 





 


Sodium Level


 143 mmol/L


(136-145) 


 





 


Potassium Level


 3.8 mmol/L


(3.5-5.1) 


 





 


Chloride Level


 104 mmol/L


() 


 





 


Carbon Dioxide Level


 25 mmol/L


(21-32) 


 





 


Anion Gap 14 (6-14)   


 


Blood Urea Nitrogen 8 mg/dL (8-26)   


 


Creatinine


 1.0 mg/dL


(0.7-1.3) 


 0.9 mg/dL


(0.7-1.3)


 


Estimated GFR


(Cockcroft-Gault) 83.6 


 


 94.4 





 


BUN/Creatinine Ratio 8 (6-20)   


 


Glucose Level


 110 mg/dL


(70-99) 


 





 


Lactic Acid Level


 1.3 mmol/L


(0.4-2.0) 


 





 


Uric Acid


 8.7 mg/dL


(3.5-7.2) 


 





 


Calcium Level


 9.1 mg/dL


(8.5-10.1) 


 





 


Total Bilirubin


 0.9 mg/dL


(0.2-1.0) 


 





 


Aspartate Amino Transf


(AST/SGOT) 47 U/L (15-37) 


 


 





 


Alanine Aminotransferase


(ALT/SGPT) 82 U/L (16-63) 


 


 





 


Alkaline Phosphatase


 110 U/L


() 


 





 


Total Protein


 8.2 g/dL


(6.4-8.2) 


 





 


Albumin


 4.3 g/dL


(3.4-5.0) 


 





 


Albumin/Globulin Ratio 1.1 (1.0-1.7)   


 


Body Fluid Source  Synovial  


 


Body Fluid Color  Yellow  


 


Body Fluid Clarity  Turbid  


 


Body Fluid Nucleated Cells


 


 65032 /cmm


(Not 





 


Body Fluid Mononuclear WBCs


(%) 


 6 % 


 





 


Body Fluid Polymorphonuclear


Cells 


 94 % 


 





 


Body Fluid Total RBCs Counted


 


 520 /cmm (Not


Established) 














Images


Images


Report reviewed, images independently reviewed.





                            [facility]


                    [address]  [place], [place] [id number] [phone]


                                        


                                 IMAGING REPORT





                                     Signed





PATIENT: FREDRICK COOPERTALATACCOUNT: [id number]     MRN#: [id number]


: 1980           LOCATION: ER              AGE: 39


SEX: M                    EXAM DT: 10/13/19         ACCESSION#: [id number]


STATUS: REG ER            ORD. PHYSICIAN: EDISON LAFLEUR


REASON: l knee pain, edema


PROCEDURE: KNEE LEFT 3V





3 view left knee


 


HISTORY: Left knee pain.


 


FINDINGS:


Joint spaces are intact. Small density overlying the medial joint 


compartment on the oblique view is likely overlapping tibial spine. No 


definite acute fracture is seen. No aggressive bone destruction. May be a 


suprapatellar joint effusion. Soft tissues are otherwise unremarkable.


 


IMPRESSION:


1. Probable joint effusion.


2. No definite acute fracture. Small bone density overlying the medial 


tibial plateau on the oblique view is likely overlying tibial spine.


3. Could consider outpatient MRI for further evaluation, particularly if 


symptoms do not improve.


 


Electronically signed by: Edison Maldonado MD (10/13/2019 4:56 PM) [facility]














DICTATED and SIGNED BY:     EDISON MALDONADO MD


DATE:     10/13/19 2332





Assessment/Plan


Assessment/Plan


Plan for left knee arthroscopy for infection, lavage and drainage today.





I spoke to the patient via the  about the differential which includes

a septic knee joint, or severe gout attack. Arthroscopic lavage will make the 

knee feel better regardless of the diagnosis, and is definitely needed if the 

knee is infected which I believe it is. He would also need intravenous 

antibiotics for infection. I recommended he stay in the hospital on IV 

antibiotics until final cultures are obtained, and we can discharge him on the 

appropriate antibiotic. If all cultures remain negative and crystals are seen, 

than we would consider treatment for gout but his knee will feel a lot better 

with lavage of gout crystals. We talked about the potential risks of ongoing 

infection, scarring, bleeding, blood clots, or other potential surgical or 

anesthetic complications. He seemed most worried about having an anesthetic and 

that he would not wake up but I reassured him that general anesthesia in 2019 is

relatively safe and I don't predict any issues with anesthesia.  The left knee 

was marked by me. All of his questions about surgery were answered. He desires 

to proceed. Written consent was obtained.











GIO PARK MD                 Oct 14, 2019 08:40

## 2019-10-14 NOTE — PDOC
Infectious Disease Note


Vital Sign


Vital Signs





Vital Signs








  Date Time  Temp Pulse Resp B/P (MAP) Pulse Ox O2 Delivery O2 Flow Rate FiO2


 


10/14/19 03:00 98.5 64 18 97/66 (76) 97 Room Air  





 98.5       











Labs


Lab





Laboratory Tests








Test


 10/13/19


16:00 10/13/19


16:20


 


White Blood Count


 11.4 x10^3/uL


(4.0-11.0) 





 


Red Blood Count


 4.81 x10^6/uL


(4.30-5.70) 





 


Hemoglobin


 15.3 g/dL


(13.0-17.5) 





 


Hematocrit


 44.0 %


(39.0-53.0) 





 


Mean Corpuscular Volume 91 fL ()  


 


Mean Corpuscular Hemoglobin 32 pg (25-35)  


 


Mean Corpuscular Hemoglobin


Concent 35 g/dL


(31-37) 





 


Red Cell Distribution Width


 13.0 %


(11.5-14.5) 





 


Platelet Count


 250 x10^3/uL


(140-400) 





 


Neutrophils (%) (Auto) 78 % (31-73)  


 


Lymphocytes (%) (Auto) 11 % (24-48)  


 


Monocytes (%) (Auto) 10 % (0-9)  


 


Eosinophils (%) (Auto) 0 % (0-3)  


 


Basophils (%) (Auto) 1 % (0-3)  


 


Neutrophils # (Auto)


 8.9 x10^3/uL


(1.8-7.7) 





 


Lymphocytes # (Auto)


 1.3 x10^3/uL


(1.0-4.8) 





 


Monocytes # (Auto)


 1.1 x10^3/uL


(0.0-1.1) 





 


Eosinophils # (Auto)


 0.0 x10^3/uL


(0.0-0.7) 





 


Basophils # (Auto)


 0.1 x10^3/uL


(0.0-0.2) 





 


Sodium Level


 143 mmol/L


(136-145) 





 


Potassium Level


 3.8 mmol/L


(3.5-5.1) 





 


Chloride Level


 104 mmol/L


() 





 


Carbon Dioxide Level


 25 mmol/L


(21-32) 





 


Anion Gap 14 (6-14)  


 


Blood Urea Nitrogen 8 mg/dL (8-26)  


 


Creatinine


 1.0 mg/dL


(0.7-1.3) 





 


Estimated GFR


(Cockcroft-Gault) 83.6 


 





 


BUN/Creatinine Ratio 8 (6-20)  


 


Glucose Level


 110 mg/dL


(70-99) 





 


Lactic Acid Level


 1.3 mmol/L


(0.4-2.0) 





 


Uric Acid


 8.7 mg/dL


(3.5-7.2) 





 


Calcium Level


 9.1 mg/dL


(8.5-10.1) 





 


Total Bilirubin


 0.9 mg/dL


(0.2-1.0) 





 


Aspartate Amino Transf


(AST/SGOT) 47 U/L (15-37) 


 





 


Alanine Aminotransferase


(ALT/SGPT) 82 U/L (16-63) 


 





 


Alkaline Phosphatase


 110 U/L


() 





 


Total Protein


 8.2 g/dL


(6.4-8.2) 





 


Albumin


 4.3 g/dL


(3.4-5.0) 





 


Albumin/Globulin Ratio 1.1 (1.0-1.7)  


 


Body Fluid Source  Synovial 


 


Body Fluid Color  Yellow 


 


Body Fluid Clarity  Turbid 


 


Body Fluid Nucleated Cells


 


 24903 /cmm


(Not


 


Body Fluid Mononuclear WBCs


(%) 


 6 % 





 


Body Fluid Polymorphonuclear


Cells 


 94 % 





 


Body Fluid Total RBCs Counted


 


 520 /cmm (Not


Established)











Objective


Assessment


pt seen, consult dictated





Plan


Plan of Care


/











OMERO URRUTIA MD               Oct 14, 2019 08:07

## 2019-10-14 NOTE — PDOC2
CONSULT


Date of Consult


Date of Consult


DATE: 10/14/19 


TIME: 10:04





Reason for Consult


Reason for Consult:


Painful right knee since yesterday morning, when he awakened with painful, 

swollen knee with no reported trauma.





Referring Physician


Referring Physician:


Dr Ontiveros





Identification/Chief Complaint


Chief Complaint


Painful right knee with swelling, erythema, and pain. 


Patient seen in ER and had aspiration of knee with reported yellow drainage.





Source


Source:  Caregiver, Chart review, Patient





History of Present Illness


Reason for Visit:


Possible infected right knee.





Past Medical History


Cardiovascular:  No pertinent hx


Pulmonary:  No pertinent hx


GI:  No pertinent hx


Heme/Onc:  No pertinent hx


Hepatobiliary:  No pertinent hx


Psych:  No pertinent hx


Rheumatologic:  No pertinent hx


Infectious disease:  No pertinent hx


ENT:  No pertinent hx


Renal/:  No pertinent hx


Endocrine:  No pertinent hx


Dermatology:  No pertinent hx





Past Surgical History


Past Surgical History:  No pertinent history





Family History


Family History:  Alcohol Abuse, No Significant





Social History


No


ALCOHOL:  none


Drugs:  None





Current Problem List


Problem List


Problems


Medical Problems:


(1) Knee pain, left


Status: Acute  











Current Medications


Current Medications





Current Medications


Morphine Sulfate (Morphine Sulfate) 4 mg 1X  ONCE IV  Last administered on 

10/13/19at 16:13;  Start 10/13/19 at 16:15;  Stop 10/13/19 at 16:16;  Status DC


Sodium Chloride 1,000 ml @  1,000 mls/hr 1X  ONCE IV  Last administered on 

10/13/19at 16:15;  Start 10/13/19 at 16:00;  Stop 10/13/19 at 16:59;  Status DC


Lidocaine HCl 20 ml 1X  ONCE IJ  Last administered on 10/13/19at 16:15;  Start 

10/13/19 at 16:30;  Stop 10/13/19 at 16:31;  Status DC


Colchicine (Colcrys) 1.2 mg 1X  STAT PO  Last administered on 10/13/19at 17:58; 

Start 10/13/19 at 17:45;  Stop 10/13/19 at 17:51;  Status DC


Ketorolac Tromethamine (Toradol 30mg Vial) 30 mg 1X  ONCE IM  Last administered 

on 10/13/19at 17:58;  Start 10/13/19 at 17:45;  Stop 10/13/19 at 17:51;  Status 

DC


Vancomycin HCl (Vanco Per Pharmacy) 1 each PRN DAILY  PRN MC SEE COMMENTS Last 

administered on 10/13/19at 20:01;  Start 10/13/19 at 18:15


Naproxen (Naprosyn) 500 mg BID PO  Last administered on 10/13/19at 21:04;  Start

10/13/19 at 21:00


Acetaminophen/ Hydrocodone Bitart (Lortab 5/325) 1 tab PRN Q6HRS  PRN PO 

MODERATE-SEVERE PAIN;  Start 10/13/19 at 18:15


Acetaminophen (Tylenol) 500 mg PRN Q6HRS  PRN PO MILD PAIN / TEMP;  Start 

10/13/19 at 18:15


Ondansetron HCl (Zofran) 4 mg PRN Q6HRS  PRN IVP NAUSEA/VOMITING;  Start 

10/13/19 at 18:15


Zolpidem Tartrate (Ambien) 5 mg PRN QHS  PRN PO INSOMNIA, MAY REPEAT IN 1HR;  

Start 10/13/19 at 18:15


Morphine Sulfate (Morphine Sulfate) 2 mg PRN Q2HR  PRN IV PAIN Last administered

on 10/13/19at 21:05;  Start 10/13/19 at 18:15


Clonidine HCl (Catapres) 0.1 mg PRN Q1HR  PRN PO HYPERTENSION;  Start 10/13/19 

at 18:15


Ondansetron HCl (Zofran) 4 mg PRN Q8HRS  PRN IV NAUSEA/VOMITING;  Start 10/13/19

at 18:15;  Stop 10/14/19 at 18:14;  Status UNV


Morphine Sulfate (Morphine Sulfate) 2 mg PRN Q2HR  PRN IV PAIN;  Start 10/13/19 

at 18:15;  Stop 10/14/19 at 18:14;  Status UNV


Vancomycin HCl 1.75 gm/Sodium Chloride 500 ml @  250 mls/hr 1X  ONCE IV  Last 

administered on 10/13/19at 19:47;  Start 10/13/19 at 18:30;  Stop 10/13/19 at 

20:29;  Status DC


Colchicine (Colcrys) 0.6 mg DAILY PO ;  Start 10/14/19 at 09:00


Vancomycin HCl 1 gm/Sodium Chloride 250 ml @  250 mls/hr Q12H IV ;  Start 

10/14/19 at 08:00


Vancomycin HCl (Vancomycin Trough Level) 1 each 1X  ONCE MC ;  Start 10/15/19 at

07:30;  Stop 10/15/19 at 07:31


Diphenhydramine HCl (Benadryl) 25 mg PRN Q6HRS  PRN PO ITCHING Last administered

on 10/13/19at 22:40;  Start 10/13/19 at 22:30


Influenza Virus Vaccine Quadrival (Afluria Quad 2019-20 (3yr Up) Syringe) 0.5 ml

ONCE ONCE VAX IM ;  Start 10/14/19 at 09:00;  Stop 10/14/19 at 09:05;  Status DC


Cefepime HCl (Maxipime) 1 gm Q12HR IVP ;  Start 10/14/19 at 09:00


Ondansetron HCl (Zofran) 4 mg PRN Q6HRS  PRN IV NAUSEA/VOMITING;  Start 10/14/19

at 09:15;  Stop 10/15/19 at 09:14


Fentanyl Citrate (Fentanyl 2ml Vial) 25 mcg PRN Q5MIN  PRN IV MILD PAIN 1-3;  

Start 10/14/19 at 09:15;  Stop 10/15/19 at 09:14


Fentanyl Citrate (Fentanyl 2ml Vial) 50 mcg PRN Q5MIN  PRN IV MODERATE TO SEVERE

PAIN;  Start 10/14/19 at 09:15;  Stop 10/15/19 at 09:14


Morphine Sulfate (Morphine Sulfate) 1 mg PRN Q10MIN  PRN IV SEVERE PAIN 7-10;  

Start 10/14/19 at 09:15;  Stop 10/15/19 at 09:14


Ringer's Solution 1,000 ml @  30 mls/hr Q24H IV ;  Start 10/14/19 at 09:03;  

Stop 10/14/19 at 21:02


Hydromorphone HCl (Dilaudid) 0.5 mg PRN Q10MIN  PRN IV SEV PAIN, Second choice; 

Start 10/14/19 at 09:15;  Stop 10/15/19 at 09:14


Prochlorperazine Edisylate (Compazine) 5 mg PACU PRN  PRN IV NAUSEA, MRX1;  Star

t 10/14/19 at 09:15;  Stop 10/15/19 at 09:14





Active Scripts


Active


Norco 5-325 Tablet (Acetaminophen/Hydrocodone Bitart) 1 Each Tablet 1 Tab PO PRN

Q6HRS PRN 3 Days





Allergies


Allergies:  


Coded Allergies:  


     No Known Drug Allergies (Unverified , 4/20/16)





Physical Exam


General:  Alert, Oriented X3, Cooperative, moderate distress


MUSCULOSKELETAL:  Abnormal exam of left (On examiination of the left knee the 

knee is swollen and warm with effusion noted. On trying to check range of motion

of the knee the patient was grimmacing and unable to move the knee. Patient able

to flex and extend at the ankle and toes without pain. Right knee has full ROM 

without pain with collateral ligaments stable and ROM 0-120 degrees.)





Vitals


VITALS





Vital Signs








  Date Time  Temp Pulse Resp B/P (MAP) Pulse Ox O2 Delivery O2 Flow Rate FiO2


 


10/14/19 07:00 98.5 66 18 127/79 (95) 98 Room Air  





 98.5       











Labs


Labs





Laboratory Tests








Test


 10/13/19


16:00 10/13/19


16:20 10/14/19


06:55


 


White Blood Count


 11.4 x10^3/uL


(4.0-11.0) 


 





 


Red Blood Count


 4.81 x10^6/uL


(4.30-5.70) 


 





 


Hemoglobin


 15.3 g/dL


(13.0-17.5) 


 





 


Hematocrit


 44.0 %


(39.0-53.0) 


 





 


Mean Corpuscular Volume 91 fL ()   


 


Mean Corpuscular Hemoglobin 32 pg (25-35)   


 


Mean Corpuscular Hemoglobin


Concent 35 g/dL


(31-37) 


 





 


Red Cell Distribution Width


 13.0 %


(11.5-14.5) 


 





 


Platelet Count


 250 x10^3/uL


(140-400) 


 





 


Neutrophils (%) (Auto) 78 % (31-73)   


 


Lymphocytes (%) (Auto) 11 % (24-48)   


 


Monocytes (%) (Auto) 10 % (0-9)   


 


Eosinophils (%) (Auto) 0 % (0-3)   


 


Basophils (%) (Auto) 1 % (0-3)   


 


Neutrophils # (Auto)


 8.9 x10^3/uL


(1.8-7.7) 


 





 


Lymphocytes # (Auto)


 1.3 x10^3/uL


(1.0-4.8) 


 





 


Monocytes # (Auto)


 1.1 x10^3/uL


(0.0-1.1) 


 





 


Eosinophils # (Auto)


 0.0 x10^3/uL


(0.0-0.7) 


 





 


Basophils # (Auto)


 0.1 x10^3/uL


(0.0-0.2) 


 





 


Sodium Level


 143 mmol/L


(136-145) 


 





 


Potassium Level


 3.8 mmol/L


(3.5-5.1) 


 





 


Chloride Level


 104 mmol/L


() 


 





 


Carbon Dioxide Level


 25 mmol/L


(21-32) 


 





 


Anion Gap 14 (6-14)   


 


Blood Urea Nitrogen 8 mg/dL (8-26)   


 


Creatinine


 1.0 mg/dL


(0.7-1.3) 


 0.9 mg/dL


(0.7-1.3)


 


Estimated GFR


(Cockcroft-Gault) 83.6 


 


 94.4 





 


BUN/Creatinine Ratio 8 (6-20)   


 


Glucose Level


 110 mg/dL


(70-99) 


 





 


Lactic Acid Level


 1.3 mmol/L


(0.4-2.0) 


 





 


Uric Acid


 8.7 mg/dL


(3.5-7.2) 


 





 


Calcium Level


 9.1 mg/dL


(8.5-10.1) 


 





 


Total Bilirubin


 0.9 mg/dL


(0.2-1.0) 


 





 


Aspartate Amino Transf


(AST/SGOT) 47 U/L (15-37) 


 


 





 


Alanine Aminotransferase


(ALT/SGPT) 82 U/L (16-63) 


 


 





 


Alkaline Phosphatase


 110 U/L


() 


 





 


Total Protein


 8.2 g/dL


(6.4-8.2) 


 





 


Albumin


 4.3 g/dL


(3.4-5.0) 


 





 


Albumin/Globulin Ratio 1.1 (1.0-1.7)   


 


Body Fluid Source  Synovial  


 


Body Fluid Color  Yellow  


 


Body Fluid Clarity  Turbid  


 


Body Fluid Nucleated Cells


 


 27810 /cmm


(Not 





 


Body Fluid Mononuclear WBCs


(%) 


 6 % 


 





 


Body Fluid Polymorphonuclear


Cells 


 94 % 


 





 


Body Fluid Total RBCs Counted


 


 520 /cmm (Not


Established) 











Laboratory Tests








Test


 10/13/19


16:00 10/13/19


16:20 10/14/19


06:55


 


White Blood Count


 11.4 x10^3/uL


(4.0-11.0) 


 





 


Red Blood Count


 4.81 x10^6/uL


(4.30-5.70) 


 





 


Hemoglobin


 15.3 g/dL


(13.0-17.5) 


 





 


Hematocrit


 44.0 %


(39.0-53.0) 


 





 


Mean Corpuscular Volume 91 fL ()   


 


Mean Corpuscular Hemoglobin 32 pg (25-35)   


 


Mean Corpuscular Hemoglobin


Concent 35 g/dL


(31-37) 


 





 


Red Cell Distribution Width


 13.0 %


(11.5-14.5) 


 





 


Platelet Count


 250 x10^3/uL


(140-400) 


 





 


Neutrophils (%) (Auto) 78 % (31-73)   


 


Lymphocytes (%) (Auto) 11 % (24-48)   


 


Monocytes (%) (Auto) 10 % (0-9)   


 


Eosinophils (%) (Auto) 0 % (0-3)   


 


Basophils (%) (Auto) 1 % (0-3)   


 


Neutrophils # (Auto)


 8.9 x10^3/uL


(1.8-7.7) 


 





 


Lymphocytes # (Auto)


 1.3 x10^3/uL


(1.0-4.8) 


 





 


Monocytes # (Auto)


 1.1 x10^3/uL


(0.0-1.1) 


 





 


Eosinophils # (Auto)


 0.0 x10^3/uL


(0.0-0.7) 


 





 


Basophils # (Auto)


 0.1 x10^3/uL


(0.0-0.2) 


 





 


Sodium Level


 143 mmol/L


(136-145) 


 





 


Potassium Level


 3.8 mmol/L


(3.5-5.1) 


 





 


Chloride Level


 104 mmol/L


() 


 





 


Carbon Dioxide Level


 25 mmol/L


(21-32) 


 





 


Anion Gap 14 (6-14)   


 


Blood Urea Nitrogen 8 mg/dL (8-26)   


 


Creatinine


 1.0 mg/dL


(0.7-1.3) 


 0.9 mg/dL


(0.7-1.3)


 


Estimated GFR


(Cockcroft-Gault) 83.6 


 


 94.4 





 


BUN/Creatinine Ratio 8 (6-20)   


 


Glucose Level


 110 mg/dL


(70-99) 


 





 


Lactic Acid Level


 1.3 mmol/L


(0.4-2.0) 


 





 


Uric Acid


 8.7 mg/dL


(3.5-7.2) 


 





 


Calcium Level


 9.1 mg/dL


(8.5-10.1) 


 





 


Total Bilirubin


 0.9 mg/dL


(0.2-1.0) 


 





 


Aspartate Amino Transf


(AST/SGOT) 47 U/L (15-37) 


 


 





 


Alanine Aminotransferase


(ALT/SGPT) 82 U/L (16-63) 


 


 





 


Alkaline Phosphatase


 110 U/L


() 


 





 


Total Protein


 8.2 g/dL


(6.4-8.2) 


 





 


Albumin


 4.3 g/dL


(3.4-5.0) 


 





 


Albumin/Globulin Ratio 1.1 (1.0-1.7)   


 


Body Fluid Source  Synovial  


 


Body Fluid Color  Yellow  


 


Body Fluid Clarity  Turbid  


 


Body Fluid Nucleated Cells


 


 13698 /cmm


(Not 





 


Body Fluid Mononuclear WBCs


(%) 


 6 % 


 





 


Body Fluid Polymorphonuclear


Cells 


 94 % 


 





 


Body Fluid Total RBCs Counted


 


 520 /cmm (Not


Established) 














Assessment/Plan


Assessment/Plan


39 year old male with no reported injury to the left knee awakened yesterday 

morning with swollen, painful knee.


Brought to [facility] ER with aspiration performed with reported yellow drainage 

received.


Patient is NPO and is scheduled for Arthroscopic irrigation and debridement of 

right knee today per Dr Bermeo.


Patients RN is acting as  and procedure explained with risks and 

benefits.


Body Nucleated fluids 55,500


Uric acid 8.7 with no reported history of gout.











ONOFRE ARENAS            Oct 14, 2019 10:26

## 2019-10-14 NOTE — NUR
SS following for discharge planning. SS reviewed pt chart. Pt is self pay pt. [facility] following 
for self pay status. Pt is from home and is currently on room air. SS will continue to 
follow for discharge planning.

## 2019-10-14 NOTE — PDOC
[employer] PROGRESS NOTE


Chief Complaint


Chief Complaint


L Knee Pain





History of Present Illness


History of Present Illness


10/14/2019


Pt was Seen and examined.


Today pt denies pain.


Denies trauma to the left knee.


Reports knee was normal before he went to bed but when he awoke he reports the 

knee was swollen and painful.





Vitals/I&O


Vitals/I&O:





                                   Vital Signs








  Date Time  Temp Pulse Resp B/P (MAP) Pulse Ox O2 Delivery O2 Flow Rate FiO2


 


10/14/19 11:57   23  98 Room Air  


 


10/14/19 11:15 100.7 77  123/77    





 100.7       














                                    I & O   


 


 10/13/19 10/13/19 10/14/19





 15:00 23:00 07:00


 


Intake Total  1000 ml 


 


Balance  1000 ml 











Physical Exam


General:  Alert, Cooperative


Heart:  Regular rate


Lungs:  Clear


Abdomen:  Soft


Extremities:  Other (there is a left knee effusion and slight warmth. There was 

no traumatic wound, there is slight blood from the aspiration site on the 

lateral knee. There is not any focal prepatellar bursitis or bursal swelling, 

this all seems to be joint swelling/joint effusion.  The knee is tender and 

slightly warm but not erythematous. Distal light touch sensation is intact. 

Minimal motion to the knee, any motion is painful. Sensory and motor function 

seem intact but motor function is limited by pain)


Skin:  No breakdown, No significant lesion





Labs


Labs:





Laboratory Tests








Test


 10/13/19


16:00 10/13/19


16:20 10/14/19


06:55


 


White Blood Count


 11.4 x10^3/uL


(4.0-11.0) 


 





 


Red Blood Count


 4.81 x10^6/uL


(4.30-5.70) 


 





 


Hemoglobin


 15.3 g/dL


(13.0-17.5) 


 





 


Hematocrit


 44.0 %


(39.0-53.0) 


 





 


Mean Corpuscular Volume 91 fL ()   


 


Mean Corpuscular Hemoglobin 32 pg (25-35)   


 


Mean Corpuscular Hemoglobin


Concent 35 g/dL


(31-37) 


 





 


Red Cell Distribution Width


 13.0 %


(11.5-14.5) 


 





 


Platelet Count


 250 x10^3/uL


(140-400) 


 





 


Neutrophils (%) (Auto) 78 % (31-73)   


 


Lymphocytes (%) (Auto) 11 % (24-48)   


 


Monocytes (%) (Auto) 10 % (0-9)   


 


Eosinophils (%) (Auto) 0 % (0-3)   


 


Basophils (%) (Auto) 1 % (0-3)   


 


Neutrophils # (Auto)


 8.9 x10^3/uL


(1.8-7.7) 


 





 


Lymphocytes # (Auto)


 1.3 x10^3/uL


(1.0-4.8) 


 





 


Monocytes # (Auto)


 1.1 x10^3/uL


(0.0-1.1) 


 





 


Eosinophils # (Auto)


 0.0 x10^3/uL


(0.0-0.7) 


 





 


Basophils # (Auto)


 0.1 x10^3/uL


(0.0-0.2) 


 





 


Sodium Level


 143 mmol/L


(136-145) 


 





 


Potassium Level


 3.8 mmol/L


(3.5-5.1) 


 





 


Chloride Level


 104 mmol/L


() 


 





 


Carbon Dioxide Level


 25 mmol/L


(21-32) 


 





 


Anion Gap 14 (6-14)   


 


Blood Urea Nitrogen 8 mg/dL (8-26)   


 


Creatinine


 1.0 mg/dL


(0.7-1.3) 


 0.9 mg/dL


(0.7-1.3)


 


Estimated GFR


(Cockcroft-Gault) 83.6 


 


 94.4 





 


BUN/Creatinine Ratio 8 (6-20)   


 


Glucose Level


 110 mg/dL


(70-99) 


 





 


Lactic Acid Level


 1.3 mmol/L


(0.4-2.0) 


 





 


Uric Acid


 8.7 mg/dL


(3.5-7.2) 


 





 


Calcium Level


 9.1 mg/dL


(8.5-10.1) 


 





 


Total Bilirubin


 0.9 mg/dL


(0.2-1.0) 


 





 


Aspartate Amino Transf


(AST/SGOT) 47 U/L (15-37) 


 


 





 


Alanine Aminotransferase


(ALT/SGPT) 82 U/L (16-63) 


 


 





 


Alkaline Phosphatase


 110 U/L


() 


 





 


Total Protein


 8.2 g/dL


(6.4-8.2) 


 





 


Albumin


 4.3 g/dL


(3.4-5.0) 


 





 


Albumin/Globulin Ratio 1.1 (1.0-1.7)   


 


Body Fluid Source  Synovial  


 


Body Fluid Color  Yellow  


 


Body Fluid Clarity  Turbid  


 


Body Fluid Nucleated Cells


 


 78866 /cmm


(Not 





 


Body Fluid Mononuclear WBCs


(%) 


 6 % 


 





 


Body Fluid Polymorphonuclear


Cells 


 94 % 


 





 


Body Fluid Total RBCs Counted


 


 520 /cmm (Not


Established) 














Review of Systems


Review of Systems:


Denies N/V/D


Denies fever


Denies CP


Denies SOB





Assessment and Plan


Assessmemt and Plan


Problems


Medical Problems:


(1) Knee pain, left


Status: Acute  








Left knee Effusion vs Hemarthrosis





1) Wound Care


2) IV abx


3) Pain Meds


4) DVT prophylaxis


5) Full code


6)  DW with Orthopedic PA Reports will be going to the OR today at 12pm for 

cleaning will plan to D/C the patient later today/tomorrow





Comment


Review of Relevant


I have reviewed the following items jimmy (where applicable) has been applied.


Medications:





Current Medications








 Medications


  (Trade)  Dose


 Ordered  Sig/Chantelle


 Route


 PRN Reason  Start Time


 Stop Time Status Last Admin


Dose Admin


 


 Morphine Sulfate


  (Morphine


 Sulfate)  4 mg  1X  ONCE


 IV


   10/13/19 16:15


 10/13/19 16:16 DC 10/13/19 16:13





 


 Sodium Chloride  1,000 ml @ 


 1,000 mls/hr  1X  ONCE


 IV


   10/13/19 16:00


 10/13/19 16:59 DC 10/13/19 16:15





 


 Lidocaine HCl  20 ml  1X  ONCE


 IJ


   10/13/19 16:30


 10/13/19 16:31 DC 10/13/19 16:15





 


 Colchicine


  (Colcrys)  1.2 mg  1X  STAT


 PO


   10/13/19 17:45


 10/13/19 17:51 DC 10/13/19 17:58





 


 Ketorolac


 Tromethamine


  (Toradol 30mg


 Vial)  30 mg  1X  ONCE


 IM


   10/13/19 17:45


 10/13/19 17:51 DC 10/13/19 17:58





 


 Vancomycin HCl


  (Vanco Per


 Pharmacy)  1 each  PRN DAILY  PRN


 MC


 SEE COMMENTS  10/13/19 18:15


    10/14/19 11:17





 


 Naproxen


  (Naprosyn)  500 mg  BID


 PO


   10/13/19 21:00


    10/13/19 21:04





 


 Morphine Sulfate


  (Morphine


 Sulfate)  2 mg  PRN Q2HR  PRN


 IV


 PAIN  10/13/19 18:15


    10/13/19 21:05





 


 Vancomycin HCl


 1.75 gm/Sodium


 Chloride  500 ml @ 


 250 mls/hr  1X  ONCE


 IV


   10/13/19 18:30


 10/13/19 20:29 DC 10/13/19 19:47





 


 Vancomycin HCl 1


 gm/Sodium Chloride  250 ml @ 


 250 mls/hr  Q12H


 IV


   10/14/19 08:00


    10/14/19 10:04





 


 Diphenhydramine


 HCl


  (Benadryl)  25 mg  PRN Q6HRS  PRN


 PO


 ITCHING  10/13/19 22:30


    10/13/19 22:40





 


 Cefepime HCl


  (Maxipime)  1 gm  Q12HR


 IVP


   10/14/19 09:00


    10/14/19 10:04





 


 Fentanyl Citrate


  (Fentanyl 2ml


 Vial)  50 mcg  PRN Q5MIN  PRN


 IV


 MODERATE TO SEVERE PAIN  10/14/19 09:15


 10/14/19 18:00  10/14/19 11:57





 


 Epinephrine HCl


  (Adrenalin)  30 mg  STK-MED ONCE


 .ROUTE


   10/14/19 11:53


 10/14/19 11:53 DC 10/14/19 12:45





 


 Bupivacaine HCl


  (Sensorcaine Mpf


 0.25%)  30 ml  STK-MED ONCE


 .ROUTE


   10/14/19 11:53


 10/14/19 11:53 DC 10/14/19 12:44




















HUNTER AHN III DO           Oct 14, 2019 13:13

## 2019-10-15 VITALS — DIASTOLIC BLOOD PRESSURE: 53 MMHG | SYSTOLIC BLOOD PRESSURE: 99 MMHG

## 2019-10-15 VITALS — DIASTOLIC BLOOD PRESSURE: 58 MMHG | SYSTOLIC BLOOD PRESSURE: 110 MMHG

## 2019-10-15 VITALS — SYSTOLIC BLOOD PRESSURE: 105 MMHG | DIASTOLIC BLOOD PRESSURE: 66 MMHG

## 2019-10-15 VITALS — DIASTOLIC BLOOD PRESSURE: 64 MMHG | SYSTOLIC BLOOD PRESSURE: 114 MMHG

## 2019-10-15 VITALS — SYSTOLIC BLOOD PRESSURE: 112 MMHG | DIASTOLIC BLOOD PRESSURE: 78 MMHG

## 2019-10-15 VITALS — SYSTOLIC BLOOD PRESSURE: 103 MMHG | DIASTOLIC BLOOD PRESSURE: 62 MMHG

## 2019-10-15 LAB — VANC TR: 6.6 MCG/ML (ref 10–20)

## 2019-10-15 RX ADMIN — OXYCODONE HYDROCHLORIDE AND ACETAMINOPHEN PRN TAB: 5; 325 TABLET ORAL at 22:31

## 2019-10-15 RX ADMIN — NAPROXEN SCH MG: 500 TABLET ORAL at 22:31

## 2019-10-15 RX ADMIN — VANCOMYCIN HYDROCHLORIDE SCH MLS/HR: 1 INJECTION, POWDER, FOR SOLUTION INTRAVENOUS at 16:57

## 2019-10-15 RX ADMIN — Medication SCH CAP: at 22:31

## 2019-10-15 RX ADMIN — NAPROXEN SCH MG: 500 TABLET ORAL at 09:09

## 2019-10-15 RX ADMIN — CEFEPIME SCH GM: 1 INJECTION, POWDER, FOR SOLUTION INTRAMUSCULAR; INTRAVENOUS at 09:08

## 2019-10-15 RX ADMIN — OXYCODONE HYDROCHLORIDE AND ACETAMINOPHEN PRN TAB: 5; 325 TABLET ORAL at 13:55

## 2019-10-15 RX ADMIN — COLCHICINE SCH MG: 0.6 TABLET, FILM COATED ORAL at 22:31

## 2019-10-15 RX ADMIN — CEFEPIME SCH GM: 1 INJECTION, POWDER, FOR SOLUTION INTRAMUSCULAR; INTRAVENOUS at 22:31

## 2019-10-15 RX ADMIN — COLCHICINE SCH MG: 0.6 TABLET, FILM COATED ORAL at 09:08

## 2019-10-15 RX ADMIN — VANCOMYCIN HYDROCHLORIDE SCH MLS/HR: 1 INJECTION, POWDER, FOR SOLUTION INTRAVENOUS at 09:08

## 2019-10-15 RX ADMIN — VANCOMYCIN HYDROCHLORIDE PRN EACH: 1 INJECTION, POWDER, LYOPHILIZED, FOR SOLUTION INTRAVENOUS at 11:06

## 2019-10-15 NOTE — PDOC
Infectious Disease Note


Subjective


Subjective


feeling better





ROS


ROS


no n/v/d/sob/fever





Vital Sign


Vital Signs





Vital Signs








  Date Time  Temp Pulse Resp B/P (MAP) Pulse Ox O2 Delivery O2 Flow Rate FiO2


 


10/15/19 07:00 98.1 54 16 105/66 (79) 97 Room Air  





 98.1       


 


10/14/19 13:33       10 











Physical Exam


PHYSICAL EXAM


GENERAL:  Alert, oriented gentleman, not in distress.


VITAL SIGNS:  Stable 


HEENT:  NAD.


NECK:  Supple, no JVP, no lymphadenopathy.


LUNGS:  Clear.


HEART:  S1, S2 regular.


ABDOMEN:  Benign.


EXTREMITIES:  No edema, cyanosis.


SKIN:  Unremarkable.  The patient is neurologically intact.  In particular


joint, left knee is swollen with some mild tenderness present.  There is no


erythema.  There is no open wound.


NEUROLOGIC:  The patient is neurologically alert, awake and appropriate.  No


focal neurologic deficit.





Labs


Lab





Laboratory Tests








Test


 10/15/19


07:30


 


Vancomycin Level Trough


 6.6 mcg/mL


(10.0-20.0)


 


Vancomycin Last Dose Date 10/14/19 


 


Vancomycin Last Dose Time 2000 








Micro





Microbiology


10/13/19 Blood Culture - Preliminary, Resulted


           NO GROWTH AFTER 1 DAY





Objective


Assessment


1.  Monoarticular inflammation of the left knee, possibility of infection is


very high with mild cell count number, although crystal analysis pending and


culture is pending.  s/p I and D


2.  Slight fever.


3.  Leukocytosis.





Plan


Plan of Care


cont antibiotics for now


await crystal analysis and culture











OMERO URRUTIA MD               Oct 15, 2019 09:10

## 2019-10-15 NOTE — PDOC
PROGRESS NOTES


Chief Complaint


Chief Complaint


L Knee Pain





History of Present Illness


History of Present Illness


Mr Jorge is a 39-year-old M who p/w with 1-day history of left knee swelling and

pain.  The patient denies any trauma.  Denies any twisting, turning, any injury.

 The patient also denies similar episode in the past.  The patient did not have 

any fever other than here he was noted to have 99.6, but denied any fever at desire

e.  Denied any nausea, vomiting, diarrhea, chest pain, shortness of breath, 

abdominal pain, urinary symptoms or bowel symptoms.  The patient had a knee 

aspiration done in the ER, which is showing 55,000 WBC. Uric acid 8.7.





His ROM is still very limited. pain is better today. No CP or SOB.





10/14/2019


Pt was Seen and examined.


Today pt denies pain.


Denies trauma to the left knee.


Reports knee was normal before he went to bed but when he awoke he reports the 

knee was swollen and painful.





Vitals


Vitals





Vital Signs








  Date Time  Temp Pulse Resp B/P (MAP) Pulse Ox O2 Delivery O2 Flow Rate FiO2


 


10/15/19 07:00 98.1 54 16 105/66 (79) 97 Room Air  





 98.1       


 


10/14/19 13:33       10 











Physical Exam


General:  Alert, Cooperative


Heart:  Regular rate


Lungs:  Clear


Abdomen:  Soft


Extremities:  Other (there is a left knee effusion and slight warmth. There was 

no traumatic wound, there is slight blood from the aspiration site on the 

lateral knee. There is not any focal prepatellar bursitis or bursal swelling, 

this all seems to be joint swelling/joint effusion.  The knee is tender and 

slightly warm but not erythematous. Distal light touch sensation is intact. 

Minimal motion to the knee, any motion is painful. Sensory and motor function 

seem intact but motor function is limited by pain)


Skin:  No breakdown, No significant lesion





Labs


LABS





Laboratory Tests








Test


 10/15/19


07:30


 


Vancomycin Level Trough


 6.6 mcg/mL


(10.0-20.0)


 


Vancomycin Last Dose Date 10/14/19 


 


Vancomycin Last Dose Time 2000 











Assessment and Plan


Assessmemt and Plan


Problems


Medical Problems:


(1) Knee pain, left


Status: Acute  











Comment


Review of Relevant


I have reviewed the following items jimmy (where applicable) has been applied.


Labs





Laboratory Tests








Test


 10/13/19


16:00 10/13/19


16:20 10/14/19


06:55 10/15/19


07:30


 


White Blood Count


 11.4 x10^3/uL


(4.0-11.0) 


 


 





 


Red Blood Count


 4.81 x10^6/uL


(4.30-5.70) 


 


 





 


Hemoglobin


 15.3 g/dL


(13.0-17.5) 


 


 





 


Hematocrit


 44.0 %


(39.0-53.0) 


 


 





 


Mean Corpuscular Volume 91 fL ()    


 


Mean Corpuscular Hemoglobin 32 pg (25-35)    


 


Mean Corpuscular Hemoglobin


Concent 35 g/dL


(31-37) 


 


 





 


Red Cell Distribution Width


 13.0 %


(11.5-14.5) 


 


 





 


Platelet Count


 250 x10^3/uL


(140-400) 


 


 





 


Neutrophils (%) (Auto) 78 % (31-73)    


 


Lymphocytes (%) (Auto) 11 % (24-48)    


 


Monocytes (%) (Auto) 10 % (0-9)    


 


Eosinophils (%) (Auto) 0 % (0-3)    


 


Basophils (%) (Auto) 1 % (0-3)    


 


Neutrophils # (Auto)


 8.9 x10^3/uL


(1.8-7.7) 


 


 





 


Lymphocytes # (Auto)


 1.3 x10^3/uL


(1.0-4.8) 


 


 





 


Monocytes # (Auto)


 1.1 x10^3/uL


(0.0-1.1) 


 


 





 


Eosinophils # (Auto)


 0.0 x10^3/uL


(0.0-0.7) 


 


 





 


Basophils # (Auto)


 0.1 x10^3/uL


(0.0-0.2) 


 


 





 


Sodium Level


 143 mmol/L


(136-145) 


 


 





 


Potassium Level


 3.8 mmol/L


(3.5-5.1) 


 


 





 


Chloride Level


 104 mmol/L


() 


 


 





 


Carbon Dioxide Level


 25 mmol/L


(21-32) 


 


 





 


Anion Gap 14 (6-14)    


 


Blood Urea Nitrogen 8 mg/dL (8-26)    


 


Creatinine


 1.0 mg/dL


(0.7-1.3) 


 0.9 mg/dL


(0.7-1.3) 





 


Estimated GFR


(Cockcroft-Gault) 83.6 


 


 94.4 


 





 


BUN/Creatinine Ratio 8 (6-20)    


 


Glucose Level


 110 mg/dL


(70-99) 


 


 





 


Lactic Acid Level


 1.3 mmol/L


(0.4-2.0) 


 


 





 


Uric Acid


 8.7 mg/dL


(3.5-7.2) 


 


 





 


Calcium Level


 9.1 mg/dL


(8.5-10.1) 


 


 





 


Total Bilirubin


 0.9 mg/dL


(0.2-1.0) 


 


 





 


Aspartate Amino Transf


(AST/SGOT) 47 U/L (15-37) 


 


 


 





 


Alanine Aminotransferase


(ALT/SGPT) 82 U/L (16-63) 


 


 


 





 


Alkaline Phosphatase


 110 U/L


() 


 


 





 


Total Protein


 8.2 g/dL


(6.4-8.2) 


 


 





 


Albumin


 4.3 g/dL


(3.4-5.0) 


 


 





 


Albumin/Globulin Ratio 1.1 (1.0-1.7)    


 


Body Fluid Source  Synovial   


 


Body Fluid Color  Yellow   


 


Body Fluid Clarity  Turbid   


 


Body Fluid Nucleated Cells


 


 85874 /cmm


(Not 


 





 


Body Fluid Mononuclear WBCs


(%) 


 6 % 


 


 





 


Body Fluid Polymorphonuclear


Cells 


 94 % 


 


 





 


Body Fluid Total RBCs Counted


 


 520 /cmm (Not


Established) 


 





 


Vancomycin Level Trough


 


 


 


 6.6 mcg/mL


(10.0-20.0)


 


Vancomycin Last Dose Date    10/14/19 


 


Vancomycin Last Dose Time    2000 








Laboratory Tests








Test


 10/15/19


07:30


 


Vancomycin Level Trough


 6.6 mcg/mL


(10.0-20.0)


 


Vancomycin Last Dose Date 10/14/19 


 


Vancomycin Last Dose Time 2000 








Microbiology


10/13/19 Blood Culture - Preliminary, Resulted


           NO GROWTH AFTER 1 DAY


Medications





Current Medications


Morphine Sulfate (Morphine Sulfate) 4 mg 1X  ONCE IV  Last administered on 

10/13/19at 16:13;  Start 10/13/19 at 16:15;  Stop 10/13/19 at 16:16;  Status DC


Sodium Chloride 1,000 ml @  1,000 mls/hr 1X  ONCE IV  Last administered on 

10/13/19at 16:15;  Start 10/13/19 at 16:00;  Stop 10/13/19 at 16:59;  Status DC


Lidocaine HCl 20 ml 1X  ONCE IJ  Last administered on 10/13/19at 16:15;  Start 

10/13/19 at 16:30;  Stop 10/13/19 at 16:31;  Status DC


Colchicine (Colcrys) 1.2 mg 1X  STAT PO  Last administered on 10/13/19at 17:58; 

Start 10/13/19 at 17:45;  Stop 10/13/19 at 17:51;  Status DC


Ketorolac Tromethamine (Toradol 30mg Vial) 30 mg 1X  ONCE IM  Last administered 

on 10/13/19at 17:58;  Start 10/13/19 at 17:45;  Stop 10/13/19 at 17:51;  Status 

DC


Vancomycin HCl (Vanco Per Pharmacy) 1 each PRN DAILY  PRN MC SEE COMMENTS Last 

administered on 10/14/19at 11:17;  Start 10/13/19 at 18:15


Naproxen (Naprosyn) 500 mg BID PO  Last administered on 10/14/19at 21:15;  Start

10/13/19 at 21:00


Acetaminophen/ Hydrocodone Bitart (Lortab 5/325) 1 tab PRN Q6HRS  PRN PO 

MODERATE-SEVERE PAIN;  Start 10/13/19 at 18:15;  Stop 10/14/19 at 13:33;  Status

DC


Acetaminophen (Tylenol) 500 mg PRN Q6HRS  PRN PO MILD PAIN / TEMP;  Start 10/13

/19 at 18:15


Ondansetron HCl (Zofran) 4 mg PRN Q6HRS  PRN IVP NAUSEA/VOMITING;  Start 

10/13/19 at 18:15


Zolpidem Tartrate (Ambien) 5 mg PRN QHS  PRN PO INSOMNIA, MAY REPEAT IN 1HR;  

Start 10/13/19 at 18:15


Morphine Sulfate (Morphine Sulfate) 2 mg PRN Q2HR  PRN IV PAIN Last administered

on 10/13/19at 21:05;  Start 10/13/19 at 18:15


Clonidine HCl (Catapres) 0.1 mg PRN Q1HR  PRN PO HYPERTENSION;  Start 10/13/19 

at 18:15


Ondansetron HCl (Zofran) 4 mg PRN Q8HRS  PRN IV NAUSEA/VOMITING;  Start 10/13/19

at 18:15;  Stop 10/14/19 at 18:14;  Status UNV


Morphine Sulfate (Morphine Sulfate) 2 mg PRN Q2HR  PRN IV PAIN;  Start 10/13/19 

at 18:15;  Stop 10/14/19 at 18:14;  Status UNV


Vancomycin HCl 1.75 gm/Sodium Chloride 500 ml @  250 mls/hr 1X  ONCE IV  Last 

administered on 10/13/19at 19:47;  Start 10/13/19 at 18:30;  Stop 10/13/19 at 

20:29;  Status DC


Colchicine (Colcrys) 0.6 mg DAILY PO ;  Start 10/14/19 at 09:00;  Stop 10/14/19 

at 13:31;  Status DC


Vancomycin HCl 1 gm/Sodium Chloride 250 ml @  250 mls/hr Q12H IV  Last ad

ministered on 10/14/19at 20:03;  Start 10/14/19 at 08:00


Vancomycin HCl (Vancomycin Trough Level) 1 each 1X  ONCE MC ;  Start 10/15/19 at

07:30;  Stop 10/15/19 at 07:31;  Status DC


Diphenhydramine HCl (Benadryl) 25 mg PRN Q6HRS  PRN PO ITCHING Last administered

on 10/13/19at 22:40;  Start 10/13/19 at 22:30


Influenza Virus Vaccine Quadrival (Afluria Quad 2019-20 (3yr Up) Syringe) 0.5 ml

ONCE ONCE VAX IM ;  Start 10/14/19 at 09:00;  Stop 10/14/19 at 09:05;  Status DC


Cefepime HCl (Maxipime) 1 gm Q12HR IVP  Last administered on 10/14/19at 21:15;  

Start 10/14/19 at 09:00


Ondansetron HCl (Zofran) 4 mg PRN Q6HRS  PRN IV NAUSEA/VOMITING;  Start 10/14/19

at 09:15;  Stop 10/14/19 at 18:00;  Status DC


Fentanyl Citrate (Fentanyl 2ml Vial) 25 mcg PRN Q5MIN  PRN IV MILD PAIN 1-3;  

Start 10/14/19 at 09:15;  Stop 10/14/19 at 18:00;  Status DC


Fentanyl Citrate (Fentanyl 2ml Vial) 50 mcg PRN Q5MIN  PRN IV MODERATE TO SEVERE

PAIN Last administered on 10/14/19at 11:57;  Start 10/14/19 at 09:15;  Stop 

10/14/19 at 18:00;  Status DC


Morphine Sulfate (Morphine Sulfate) 1 mg PRN Q10MIN  PRN IV SEVERE PAIN 7-10;  

Start 10/14/19 at 09:15;  Stop 10/14/19 at 18:00;  Status DC


Ringer's Solution 1,000 ml @  30 mls/hr Q24H IV ;  Start 10/14/19 at 09:03;  

Stop 10/14/19 at 21:02;  Status DC


Hydromorphone HCl (Dilaudid) 0.5 mg PRN Q10MIN  PRN IV SEV PAIN, Second choice; 

Start 10/14/19 at 09:15;  Stop 10/14/19 at 18:00;  Status DC


Prochlorperazine Edisylate (Compazine) 5 mg PACU PRN  PRN IV NAUSEA, MRX1;  

Start 10/14/19 at 09:15;  Stop 10/14/19 at 18:00;  Status DC


Propofol 20 ml @ As Directed STK-MED ONCE IV ;  Start 10/14/19 at 11:27;  Stop 

10/14/19 at 11:27;  Status DC


Lidocaine HCl (Lidocaine Pf 2% Vial) 5 ml STK-MED ONCE .ROUTE ;  Start 10/14/19 

at 11:27;  Stop 10/14/19 at 11:27;  Status DC


Ondansetron HCl (Zofran) 4 mg STK-MED ONCE .ROUTE ;  Start 10/14/19 at 11:27;  

Stop 10/14/19 at 11:28;  Status DC


Dexamethasone Sodium Phosphate (Decadron) 4 mg STK-MED ONCE .ROUTE ;  Start 

10/14/19 at 11:27;  Stop 10/14/19 at 11:28;  Status DC


Fentanyl Citrate (Fentanyl 2ml Vial) 100 mcg STK-MED ONCE .ROUTE ;  Start 

10/14/19 at 11:27;  Stop 10/14/19 at 11:28;  Status DC


Midazolam HCl (Versed) 2 mg STK-MED ONCE .ROUTE ;  Start 10/14/19 at 11:27;  

Stop 10/14/19 at 11:28;  Status DC


Fentanyl Citrate (Fentanyl 2ml Vial) 100 mcg STK-MED ONCE .ROUTE ;  Start 

10/14/19 at 11:51;  Stop 10/14/19 at 11:51;  Status DC


Epinephrine HCl (Adrenalin) 30 mg STK-MED ONCE .ROUTE  Last administered on 

10/14/19at 12:45;  Start 10/14/19 at 11:53;  Stop 10/14/19 at 11:53;  Status DC


Bupivacaine HCl (Sensorcaine Mpf 0.25%) 30 ml STK-MED ONCE .ROUTE  Last 

administered on 10/14/19at 12:44;  Start 10/14/19 at 11:53;  Stop 10/14/19 at 

11:53;  Status DC


Sodium Chloride (SODIUM CHLORIDE 20ml) 20 ml STK-MED ONCE IJ ;  Start 10/14/19 

at 12:27;  Stop 10/14/19 at 12:27;  Status DC


Ketorolac Tromethamine (Toradol 30mg Vial) 30 mg STK-MED ONCE .ROUTE ;  Start 

10/14/19 at 12:46;  Stop 10/14/19 at 12:47;  Status DC


Hydromorphone HCl (Dilaudid) 2 mg STK-MED ONCE .ROUTE ;  Start 10/14/19 at 

12:48;  Stop 10/14/19 at 12:48;  Status DC


Sevoflurane (Ultane) 60 ml STK-MED ONCE IH ;  Start 10/14/19 at 12:57;  Stop 

10/14/19 at 12:57;  Status DC


Bupivacaine HCl (Sensorcaine Mpf 0.25%) 30 ml STK-MED ONCE .ROUTE  Last 

administered on 10/14/19at 13:03;  Start 10/14/19 at 13:01;  Stop 10/14/19 at 

13:01;  Status DC


Colchicine (Colcrys) 0.6 mg BID PO  Last administered on 10/14/19at 21:15;  

Start 10/14/19 at 21:00;  Stop 10/28/19 at 20:59


Oxycodone/ Acetaminophen (Percocet 5/325) 1 tab PRN Q4HRS  PRN PO MODERATE PAIN;

 Start 10/14/19 at 13:15


Oxycodone/ Acetaminophen (Percocet 5/325) 2 tab PRN Q4HRS  PRN PO SEVERE PAIN;  

Start 10/14/19 at 13:45





Active Scripts


Active


Norco 5-325 Tablet (Acetaminophen/Hydrocodone Bitart) 1 Each Tablet 1 Tab PO PRN

Q6HRS PRN 3 Days


Vitals/I & O





Vital Sign - Last 24 Hours








 10/14/19 10/14/19 10/14/19 10/14/19





 11:15 11:57 13:16 13:16


 


Temp 100.7   98.1





 100.7   98.1


 


Pulse 77   76


 


Resp 21 23  16


 


B/P (MAP) 123/77   112/65


 


Pulse Ox 98 98  97


 


O2 Delivery Room Air Room Air Mask Simple Mask


 


O2 Flow Rate   10 10


 


    





    





 10/14/19 10/14/19 10/14/19 10/14/19





 13:33 13:48 14:35 15:20


 


Temp  98.5 98.4 





  98.5 98.4 


 


Pulse 74 90 75 72


 


Resp 16 17 18 18


 


B/P (MAP) 108/60 121/69 111/72 (85) 107/69 (82)


 


Pulse Ox 98 96 100 90


 


O2 Delivery Simple Mask Room Air Room Air Room Air


 


O2 Flow Rate 10   


 


    





    





 10/14/19 10/14/19 10/15/19 10/15/19





 19:00 22:57 03:00 07:00


 


Temp 98.4 98.6 98.0 98.1





 98.4 98.6 98.0 98.1


 


Pulse 65 66 60 54


 


Resp 18 18 18 16


 


B/P (MAP) 106/63 (77) 104/55 (71) 103/62 (76) 105/66 (79)


 


Pulse Ox 96 96 95 97


 


O2 Delivery Room Air Room Air Room Air Room Air














Intake and Output   


 


 10/14/19 10/14/19 10/15/19





 15:00 23:00 07:00


 


Intake Total 850 ml  


 


Output Total 20 ml  


 


Balance 830 ml  

















TOY CUELLAR MD        Oct 15, 2019 08:41

## 2019-10-15 NOTE — NUR
Pharmacy Vancomycin Dosing Note



S:Consulted to monitor and dose vancomycin started 10/13/19.



O:KAT COOPER is a 39 year old M with  R/O SEPTIC JOINT .



Height: 5 feet, 5 inches

Weight: 68.963166 kg

Ideal Body Weight: 61.50 

Adjusted Body Weight: 64.10 

Dosing Weight: Actual



Other Antibiotics: 

CEFEPIME



LABS:

Last BUN: 8 

Last Creatinine: 0.9 

Creatinine Clearance: 96.8 mL/min

Last WBC: 11.4 

Last Procalcitonin: 

Tmax (past 24 hours): 98.5 



Microbiology: 

BLOOD CULTURE  Preliminary

        NO GROWTH AFTER 1 DAY



I/O: 850/20 



Drug Levels:

Last Trough level: 6.6  on 10/15/19 at 0730 

Last dose given 10/15/19 at 0908 



Vancomycin Dosing:

Loading Dose: 1750 mg x1

Dosing Weight: Actual

Target Trough: 15-20





A: Based on kinetics and level of 6.6:





P: 1. Increase Vancomycin to 1250 mg IV q8h.

   2. Follow up Trough level as needed. 

   3. Pharmacy will continue to monitor, follow and adjust therapy as needed.

 

 Toni Escobar [facility], 10/15/19 [id number]

## 2019-10-16 VITALS — SYSTOLIC BLOOD PRESSURE: 115 MMHG | DIASTOLIC BLOOD PRESSURE: 73 MMHG

## 2019-10-16 VITALS — DIASTOLIC BLOOD PRESSURE: 71 MMHG | SYSTOLIC BLOOD PRESSURE: 116 MMHG

## 2019-10-16 VITALS — SYSTOLIC BLOOD PRESSURE: 122 MMHG | DIASTOLIC BLOOD PRESSURE: 69 MMHG

## 2019-10-16 VITALS — DIASTOLIC BLOOD PRESSURE: 67 MMHG | SYSTOLIC BLOOD PRESSURE: 98 MMHG

## 2019-10-16 VITALS — SYSTOLIC BLOOD PRESSURE: 112 MMHG | DIASTOLIC BLOOD PRESSURE: 64 MMHG

## 2019-10-16 VITALS — SYSTOLIC BLOOD PRESSURE: 105 MMHG | DIASTOLIC BLOOD PRESSURE: 63 MMHG

## 2019-10-16 LAB
CREAT SERPL-MCNC: 0.9 MG/DL (ref 0.7–1.3)
GFR SERPLBLD BASED ON 1.73 SQ M-ARVRAT: 94.4 ML/MIN

## 2019-10-16 RX ADMIN — CEFEPIME SCH GM: 1 INJECTION, POWDER, FOR SOLUTION INTRAMUSCULAR; INTRAVENOUS at 09:29

## 2019-10-16 RX ADMIN — VANCOMYCIN HYDROCHLORIDE PRN EACH: 1 INJECTION, POWDER, LYOPHILIZED, FOR SOLUTION INTRAVENOUS at 12:22

## 2019-10-16 RX ADMIN — OXYCODONE HYDROCHLORIDE AND ACETAMINOPHEN PRN TAB: 5; 325 TABLET ORAL at 20:43

## 2019-10-16 RX ADMIN — NAPROXEN SCH MG: 500 TABLET ORAL at 09:29

## 2019-10-16 RX ADMIN — VANCOMYCIN HYDROCHLORIDE SCH MLS/HR: 1 INJECTION, POWDER, FOR SOLUTION INTRAVENOUS at 17:25

## 2019-10-16 RX ADMIN — NAPROXEN SCH MG: 500 TABLET ORAL at 20:41

## 2019-10-16 RX ADMIN — COLCHICINE SCH MG: 0.6 TABLET, FILM COATED ORAL at 20:41

## 2019-10-16 RX ADMIN — CEFEPIME SCH GM: 1 INJECTION, POWDER, FOR SOLUTION INTRAMUSCULAR; INTRAVENOUS at 20:41

## 2019-10-16 RX ADMIN — OXYCODONE HYDROCHLORIDE AND ACETAMINOPHEN PRN TAB: 5; 325 TABLET ORAL at 16:26

## 2019-10-16 RX ADMIN — Medication SCH CAP: at 20:41

## 2019-10-16 RX ADMIN — VANCOMYCIN HYDROCHLORIDE PRN EACH: 1 INJECTION, POWDER, LYOPHILIZED, FOR SOLUTION INTRAVENOUS at 14:32

## 2019-10-16 RX ADMIN — VANCOMYCIN HYDROCHLORIDE SCH MLS/HR: 1 INJECTION, POWDER, FOR SOLUTION INTRAVENOUS at 00:49

## 2019-10-16 RX ADMIN — COLCHICINE SCH MG: 0.6 TABLET, FILM COATED ORAL at 09:29

## 2019-10-16 RX ADMIN — VANCOMYCIN HYDROCHLORIDE SCH MLS/HR: 1 INJECTION, POWDER, FOR SOLUTION INTRAVENOUS at 10:12

## 2019-10-16 RX ADMIN — Medication SCH CAP: at 09:29

## 2019-10-16 NOTE — PDOC
PROGRESS NOTES


Chief Complaint


Chief Complaint


L Knee Pain





History of Present Illness


History of Present Illness


Mr Jorge is a 39-year-old M who p/w with 1-day history of left knee swelling and

pain.  The patient denies any trauma.  Denies any twisting, turning, any injury.

 The patient also denies similar episode in the past.  The patient did not have 

any fever other than here he was noted to have 99.6, but denied any fever at desire

e.  Denied any nausea, vomiting, diarrhea, chest pain, shortness of breath, 

abdominal pain, urinary symptoms or bowel symptoms.  The patient had a knee 

aspiration done in the ER, which is showing 55,000 WBC. Uric acid 8.7.





His ROM is still very limited. pain is better today. No CP or SOB. Crystal 

analysis negative. Culture negative, but significant WBC in knee fluid. D/w ID 

will need at least another day of IV antibiotics





10/14/2019


Pt was Seen and examined.


Today pt denies pain.


Denies trauma to the left knee.


Reports knee was normal before he went to bed but when he awoke he reports the 

knee was swollen and painful.





Vitals


Vitals





Vital Signs








  Date Time  Temp Pulse Resp B/P (MAP) Pulse Ox O2 Delivery O2 Flow Rate FiO2


 


10/16/19 07:00 98.0 50 18 116/71 (86) 99 Room Air  





 98.0       











Physical Exam


Physical Exam


GENERAL:  Alert, oriented gentleman, not in distress.


VITAL SIGNS:  Stable 


HEENT:  NAD.


NECK:  Supple, no JVP, no lymphadenopathy.


LUNGS:  Clear.


HEART:  S1, S2 regular.


ABDOMEN:  Benign.


EXTREMITIES:  No edema, cyanosis.


SKIN:  Unremarkable.  The patient is neurologically intact.  In particular


joint, left knee is swollen with some mild tenderness present.  There is no


erythema.  There is no open wound.


NEUROLOGIC:  The patient is neurologically alert, awake and appropriate.  No


focal neurologic deficit.


General:  Alert, Cooperative


Heart:  Regular rate


Lungs:  Clear


Abdomen:  Soft


Extremities:  Other (there is a left knee effusion and slight warmth. There was 

no traumatic wound, there is slight blood from the aspiration site on the l

ateral knee. There is not any focal prepatellar bursitis or bursal swelling, 

this all seems to be joint swelling/joint effusion.  The knee is tender and 

slightly warm but not erythematous. Distal light touch sensation is intact. 

Minimal motion to the knee, any motion is painful. Sensory and motor function 

seem intact but motor function is limited by pain)


Skin:  No breakdown, No significant lesion





Assessment and Plan


Assessmemt and Plan


Problems


Medical Problems:


(1) Knee pain, left


Status: Acute  











Comment


Review of Relevant


I have reviewed the following items jimmy (where applicable) has been applied.


Labs





Laboratory Tests








Test


 10/14/19


12:38 10/15/19


07:30


 


Body Fluid Crystals


 Comment (None


seen) 





 


Vancomycin Level Trough


 


 6.6 mcg/mL


(10.0-20.0)


 


Vancomycin Last Dose Date  10/14/19 


 


Vancomycin Last Dose Time  2000 








Microbiology


10/13/19 Blood Culture - Preliminary, Resulted


           NO GROWTH AFTER 2 DAYS


10/13/19 Anaerobic/Aerobic Culture, Resulted


           Pending


10/13/19 Anaerobic Culture Result 1 ([facility]), Resulted


           Pending


10/13/19 Aerobic Culture, Resulted


           Pending


10/13/19 Aerobic Culture Result 1 ([facility]), Resulted


           Pending


10/13/19 Gram Stain - Final, Resulted


           


10/13/19 Gram Stain Result 1 ([facility]) - Final, Resulted


           


10/13/19 Gram Stain Result 2 ([facility]) - Final, Resulted


Medications





Current Medications


Morphine Sulfate (Morphine Sulfate) 4 mg 1X  ONCE IV  Last administered on 

10/13/19at 16:13;  Start 10/13/19 at 16:15;  Stop 10/13/19 at 16:16;  Status DC


Sodium Chloride 1,000 ml @  1,000 mls/hr 1X  ONCE IV  Last administered on 

10/13/19at 16:15;  Start 10/13/19 at 16:00;  Stop 10/13/19 at 16:59;  Status DC


Lidocaine HCl 20 ml 1X  ONCE IJ  Last administered on 10/13/19at 16:15;  Start 

10/13/19 at 16:30;  Stop 10/13/19 at 16:31;  Status DC


Colchicine (Colcrys) 1.2 mg 1X  STAT PO  Last administered on 10/13/19at 17:58; 

Start 10/13/19 at 17:45;  Stop 10/13/19 at 17:51;  Status DC


Ketorolac Tromethamine (Toradol 30mg Vial) 30 mg 1X  ONCE IM  Last administered 

on 10/13/19at 17:58;  Start 10/13/19 at 17:45;  Stop 10/13/19 at 17:51;  Status 

DC


Vancomycin HCl (Vanco Per Pharmacy) 1 each PRN DAILY  PRN MC SEE COMMENTS Last 

administered on 10/15/19at 11:06;  Start 10/13/19 at 18:15


Naproxen (Naprosyn) 500 mg BID PO  Last administered on 10/15/19at 22:31;  Start

10/13/19 at 21:00


Acetaminophen/ Hydrocodone Bitart (Lortab 5/325) 1 tab PRN Q6HRS  PRN PO 

MODERATE-SEVERE PAIN;  Start 10/13/19 at 18:15;  Stop 10/14/19 at 13:33;  Status

DC


Acetaminophen (Tylenol) 500 mg PRN Q6HRS  PRN PO MILD PAIN / TEMP;  Start 

10/13/19 at 18:15


Ondansetron HCl (Zofran) 4 mg PRN Q6HRS  PRN IVP NAUSEA/VOMITING;  Start 

10/13/19 at 18:15


Zolpidem Tartrate (Ambien) 5 mg PRN QHS  PRN PO INSOMNIA, MAY REPEAT IN 1HR;  

Start 10/13/19 at 18:15


Morphine Sulfate (Morphine Sulfate) 2 mg PRN Q2HR  PRN IV PAIN Last administered

on 10/13/19at 21:05;  Start 10/13/19 at 18:15


Clonidine HCl (Catapres) 0.1 mg PRN Q1HR  PRN PO HYPERTENSION;  Start 10/13/19 

at 18:15


Ondansetron HCl (Zofran) 4 mg PRN Q8HRS  PRN IV NAUSEA/VOMITING;  Start 10/13/19

at 18:15;  Stop 10/14/19 at 18:14;  Status UNV


Morphine Sulfate (Morphine Sulfate) 2 mg PRN Q2HR  PRN IV PAIN;  Start 10/13/19 

at 18:15;  Stop 10/14/19 at 18:14;  Status UNV


Vancomycin HCl 1.75 gm/Sodium Chloride 500 ml @  250 mls/hr 1X  ONCE IV  Last 

administered on 10/13/19at 19:47;  Start 10/13/19 at 18:30;  Stop 10/13/19 at 

20:29;  Status DC


Colchicine (Colcrys) 0.6 mg DAILY PO ;  Start 10/14/19 at 09:00;  Stop 10/14/19 

at 13:31;  Status DC


Vancomycin HCl 1 gm/Sodium Chloride 250 ml @  250 mls/hr Q12H IV  Last 

administered on 10/15/19at 09:08;  Start 10/14/19 at 08:00;  Stop 10/15/19 at 

11:06;  Status DC


Vancomycin HCl (Vancomycin Trough Level) 1 each 1X  ONCE MC ;  Start 10/15/19 at

07:30;  Stop 10/15/19 at 07:31;  Status DC


Diphenhydramine HCl (Benadryl) 25 mg PRN Q6HRS  PRN PO ITCHING Last administered

on 10/13/19at 22:40;  Start 10/13/19 at 22:30


Influenza Virus Vaccine Quadrival (Afluria Quad 2019-20 (3yr Up) Syringe) 0.5 ml

ONCE ONCE VAX IM  Last administered on 10/15/19at 09:13;  Start 10/14/19 at 

09:00;  Stop 10/14/19 at 09:05;  Status DC


Cefepime HCl (Maxipime) 1 gm Q12HR IVP  Last administered on 10/15/19at 22:31;  

Start 10/14/19 at 09:00


Ondansetron HCl (Zofran) 4 mg PRN Q6HRS  PRN IV NAUSEA/VOMITING;  Start 10/14/19

at 09:15;  Stop 10/14/19 at 18:00;  Status DC


Fentanyl Citrate (Fentanyl 2ml Vial) 25 mcg PRN Q5MIN  PRN IV MILD PAIN 1-3;  

Start 10/14/19 at 09:15;  Stop 10/14/19 at 18:00;  Status DC


Fentanyl Citrate (Fentanyl 2ml Vial) 50 mcg PRN Q5MIN  PRN IV MODERATE TO SEVERE

PAIN Last administered on 10/14/19at 11:57;  Start 10/14/19 at 09:15;  Stop 

10/14/19 at 18:00;  Status DC


Morphine Sulfate (Morphine Sulfate) 1 mg PRN Q10MIN  PRN IV SEVERE PAIN 7-10;  

Start 10/14/19 at 09:15;  Stop 10/14/19 at 18:00;  Status DC


Ringer's Solution 1,000 ml @  30 mls/hr Q24H IV ;  Start 10/14/19 at 09:03;  

Stop 10/14/19 at 21:02;  Status DC


Hydromorphone HCl (Dilaudid) 0.5 mg PRN Q10MIN  PRN IV SEV PAIN, Second choice; 

Start 10/14/19 at 09:15;  Stop 10/14/19 at 18:00;  Status DC


Prochlorperazine Edisylate (Compazine) 5 mg PACU PRN  PRN IV NAUSEA, MRX1;  

Start 10/14/19 at 09:15;  Stop 10/14/19 at 18:00;  Status DC


Propofol 20 ml @ As Directed STK-MED ONCE IV ;  Start 10/14/19 at 11:27;  Stop 

10/14/19 at 11:27;  Status DC


Lidocaine HCl (Lidocaine Pf 2% Vial) 5 ml STK-MED ONCE .ROUTE ;  Start 10/14/19 

at 11:27;  Stop 10/14/19 at 11:27;  Status DC


Ondansetron HCl (Zofran) 4 mg STK-MED ONCE .ROUTE ;  Start 10/14/19 at 11:27;  

Stop 10/14/19 at 11:28;  Status DC


Dexamethasone Sodium Phosphate (Decadron) 4 mg STK-MED ONCE .ROUTE ;  Start 

10/14/19 at 11:27;  Stop 10/14/19 at 11:28;  Status DC


Fentanyl Citrate (Fentanyl 2ml Vial) 100 mcg STK-MED ONCE .ROUTE ;  Start 

10/14/19 at 11:27;  Stop 10/14/19 at 11:28;  Status DC


Midazolam HCl (Versed) 2 mg STK-MED ONCE .ROUTE ;  Start 10/14/19 at 11:27;  

Stop 10/14/19 at 11:28;  Status DC


Fentanyl Citrate (Fentanyl 2ml Vial) 100 mcg STK-MED ONCE .ROUTE ;  Start 

10/14/19 at 11:51;  Stop 10/14/19 at 11:51;  Status DC


Epinephrine HCl (Adrenalin) 30 mg STK-MED ONCE .ROUTE  Last administered on 

10/14/19at 12:45;  Start 10/14/19 at 11:53;  Stop 10/14/19 at 11:53;  Status DC


Bupivacaine HCl (Sensorcaine Mpf 0.25%) 30 ml STK-MED ONCE .ROUTE  Last 

administered on 10/14/19at 12:44;  Start 10/14/19 at 11:53;  Stop 10/14/19 at 

11:53;  Status DC


Sodium Chloride (SODIUM CHLORIDE 20ml) 20 ml STK-MED ONCE IJ ;  Start 10/14/19 

at 12:27;  Stop 10/14/19 at 12:27;  Status DC


Ketorolac Tromethamine (Toradol 30mg Vial) 30 mg STK-MED ONCE .ROUTE ;  Start 

10/14/19 at 12:46;  Stop 10/14/19 at 12:47;  Status DC


Hydromorphone HCl (Dilaudid) 2 mg STK-MED ONCE .ROUTE ;  Start 10/14/19 at 

12:48;  Stop 10/14/19 at 12:48;  Status DC


Sevoflurane (Ultane) 60 ml STK-MED ONCE IH ;  Start 10/14/19 at 12:57;  Stop 

10/14/19 at 12:57;  Status DC


Bupivacaine HCl (Sensorcaine Mpf 0.25%) 30 ml STK-MED ONCE .ROUTE  Last 

administered on 10/14/19at 13:03;  Start 10/14/19 at 13:01;  Stop 10/14/19 at 

13:01;  Status DC


Colchicine (Colcrys) 0.6 mg BID PO  Last administered on 10/15/19at 22:31;  

Start 10/14/19 at 21:00;  Stop 10/28/19 at 20:59


Oxycodone/ Acetaminophen (Percocet 5/325) 1 tab PRN Q4HRS  PRN PO MODERATE PAIN;

 Start 10/14/19 at 13:15


Oxycodone/ Acetaminophen (Percocet 5/325) 2 tab PRN Q4HRS  PRN PO SEVERE PAIN 

Last administered on 10/15/19at 22:31;  Start 10/14/19 at 13:45


Vancomycin HCl 1.25 gm/Sodium Chloride 250 ml @  167 mls/hr Q8H IV  Last 

administered on 10/16/19at 00:49;  Start 10/15/19 at 17:00


Lactobacillus Rhamnosus (Culturelle) 1 cap BID PO  Last administered on 

10/15/19at 22:31;  Start 10/15/19 at 21:00





Active Scripts


Active


Norco 5-325 Tablet (Acetaminophen/Hydrocodone Bitart) 1 Each Tablet 1 Tab PO PRN

Q6HRS PRN 3 Days


Vitals/I & O





Vital Sign - Last 24 Hours








 10/15/19 10/15/19 10/15/19 10/15/19





 11:00 13:55 15:00 19:15


 


Temp 98.4  98.2 98.1





 98.4  98.2 98.1


 


Pulse 62  63 78


 


Resp 16  16 18


 


B/P (MAP) 99/53 (68)  110/58 (75) 114/64 (81)


 


Pulse Ox 99  96 94


 


O2 Delivery Room Air Room Air Room Air Room Air


 


    





    





 10/15/19 10/15/19 10/15/19 10/15/19





 19:35 22:31 23:05 23:31


 


Temp   98.2 





   98.2 


 


Pulse   61 


 


Resp   18 


 


B/P (MAP)   112/78 (89) 


 


Pulse Ox   93 


 


O2 Delivery Room Air Room Air Room Air Room Air


 


    





    





 10/16/19 10/16/19  





 03:01 07:00  


 


Temp 97.9 98.0  





 97.9 98.0  


 


Pulse 54 50  


 


Resp 18 18  


 


B/P (MAP) 98/67 (77) 116/71 (86)  


 


Pulse Ox 97 99  


 


O2 Delivery Room Air Room Air  














Intake and Output   


 


 10/15/19 10/15/19 10/16/19





 14:59 22:59 06:59


 


Intake Total   490 ml


 


Balance   490 ml

















TOY CUELLAR MD        Oct 16, 2019 08:26

## 2019-10-16 NOTE — PDOC
Infectious Disease Note


Subjective


Subjective


feeling better


though cont to have pain





ROS


ROS


no n/v/d





Vital Sign


Vital Signs





Vital Signs








  Date Time  Temp Pulse Resp B/P (MAP) Pulse Ox O2 Delivery O2 Flow Rate FiO2


 


10/16/19 07:00 98.0 50 18 116/71 (86) 99 Room Air  





 98.0       











Physical Exam


PHYSICAL EXAM


GENERAL:  Alert, oriented gentleman, not in distress.


VITAL SIGNS:  Stable 


HEENT:  NAD.


NECK:  Supple, no JVP, no lymphadenopathy.


LUNGS:  Clear.


HEART:  S1, S2 regular.


ABDOMEN:  Benign.


EXTREMITIES:  No edema, cyanosis.


SKIN:  Unremarkable.  The patient is neurologically intact.  In particular


joint, left knee is swollen with some mild tenderness present.  There is no


erythema.  There is no open wound.


NEUROLOGIC:  The patient is neurologically alert, awake and appropriate.  No


focal neurologic deficit.





Labs


Micro





Microbiology


10/13/19 Blood Culture - Preliminary, Resulted


           NO GROWTH AFTER 1 DAY








crystal analysis neg





Objective


Assessment


1.  Monoarticular inflammation of the left knee, possibility of infection is


very high with mild cell count number, although crystal analysis pending and


culture is pending.  s/p I and D


2.  Slight fever.


3.  Leukocytosis.





Plan


Plan of Care


cont antibiotics for now


await  culture











OMERO URRUTIA MD               Oct 16, 2019 09:07

## 2019-10-17 VITALS — DIASTOLIC BLOOD PRESSURE: 85 MMHG | SYSTOLIC BLOOD PRESSURE: 123 MMHG

## 2019-10-17 VITALS — SYSTOLIC BLOOD PRESSURE: 112 MMHG | DIASTOLIC BLOOD PRESSURE: 75 MMHG

## 2019-10-17 VITALS — SYSTOLIC BLOOD PRESSURE: 122 MMHG | DIASTOLIC BLOOD PRESSURE: 62 MMHG

## 2019-10-17 RX ADMIN — NAPROXEN SCH MG: 500 TABLET ORAL at 08:30

## 2019-10-17 RX ADMIN — OXYCODONE HYDROCHLORIDE AND ACETAMINOPHEN PRN TAB: 5; 325 TABLET ORAL at 08:31

## 2019-10-17 RX ADMIN — VANCOMYCIN HYDROCHLORIDE SCH MLS/HR: 1 INJECTION, POWDER, FOR SOLUTION INTRAVENOUS at 08:32

## 2019-10-17 RX ADMIN — VANCOMYCIN HYDROCHLORIDE SCH MLS/HR: 1 INJECTION, POWDER, FOR SOLUTION INTRAVENOUS at 00:36

## 2019-10-17 RX ADMIN — CEFEPIME SCH GM: 1 INJECTION, POWDER, FOR SOLUTION INTRAMUSCULAR; INTRAVENOUS at 08:31

## 2019-10-17 RX ADMIN — COLCHICINE SCH MG: 0.6 TABLET, FILM COATED ORAL at 08:30

## 2019-10-17 RX ADMIN — Medication SCH CAP: at 08:30

## 2019-10-17 NOTE — PDOC3
Discharge Summary


Visit Information


Date of Admission:  Oct 13, 2019


Date of Discharge:  Oct 17, 2019


Admitting Diagnosis:  Septic arthritis left knee


Final Diagnosis


Problems


Medical Problems:


(1) Knee pain, left


Status: Acute  











Brief Hospital Course


Allergies





                                    Allergies








Coded Allergies Type Severity Reaction Last Updated Verified


 


  No Known Drug Allergies    4/20/16 No








Vital Signs





Vital Signs








  Date Time  Temp Pulse Resp B/P (MAP) Pulse Ox O2 Delivery O2 Flow Rate FiO2


 


10/17/19 09:30      Room Air  


 


10/17/19 07:00 97.3 46 16 123/85 (98) 97   





 97.3       








Lab Results





Laboratory Tests








Test


 10/16/19


12:45


 


Creatinine


 0.9 mg/dL


(0.7-1.3)


 


Estimated GFR


(Cockcroft-Gault) 94.4 











Laboratory Tests








Test


 10/16/19


12:45


 


Creatinine


 0.9 mg/dL


(0.7-1.3)


 


Estimated GFR


(Cockcroft-Gault) 94.4 











Brief Hospital Course


Mr Jorge is a 39-year-old M who p/w with 1-day history of left knee swelling and

pain.  The patient denies any trauma.  Denies any twisting, turning, any injury.

 The patient also denies similar episode in the past.  The patient did not have 

any fever other than here he was noted to have 99.6, but denied any fever at 

home.  Denied any nausea, vomiting, diarrhea, chest pain, shortness of breath, 

abdominal pain, urinary symptoms or bowel symptoms.  The patient had a knee 

aspiration done in the ER, which is showing 55,000 WBC. Uric acid 8.7.





His ROM is still very limited. pain is better today. No CP or SOB. Crystal 

analysis negative. Culture negative, but significant WBC in knee fluid. D/w ID 

will need at least another day of IV antibiotics.





Problem list:


L Knee Pain


Left knee septic arthritis





Greater than 30 minutes spent on d/c





Discharge Information


Condition at Discharge:  Improved


Follow Up:  Weeks (1)


Disposition/Orders:  D/C to Home


Scheduled


Ciprofloxacin Hcl (Cipro) 250 Mg Tablet, 500 MG PO BID for Septic arthritis for 

10 Days, #40


   Can sub with 500mg Tabs #20 


   Prescribed by: TOY CUELLAR MD on 10/17/19 1001


Doxycycline Hyclate (Doxycycline Hyclate) 100 Mg Tablet, 100 MG PO BID for 

Septic arthritis for 10 Days, #20


   Can sub with any Doxycycline formulation for patient cost 


   Prescribed by: TOY CUELLAR MD on 10/17/19 1001





Scheduled PRN


Hydrocodone/Apap 5-325 (Norco 5-325 Tablet) 1 Each Tablet, 1 TAB PO PRN Q6HRS 

PRN for PAIN for 6 Days, #20 Ref 0


   Prescribed by: TOY CUELLAR MD on 10/17/19 1001











TOY CUELLAR MD        Oct 17, 2019 10:20

## 2019-10-17 NOTE — PDOC
Infectious Disease Note


Subjective


Subjective


feeling better





ROS


ROS


no n/v/d/sob





Vital Sign


Vital Signs





Vital Signs








  Date Time  Temp Pulse Resp B/P (MAP) Pulse Ox O2 Delivery O2 Flow Rate FiO2


 


10/17/19 08:31      Room Air  


 


10/17/19 07:00 97.3 46 16 123/85 (98) 97   





 97.3       











Physical Exam


PHYSICAL EXAM


GENERAL:  Alert, oriented gentleman, not in distress.


VITAL SIGNS:  Stable 


HEENT:  NAD.


NECK:  Supple, no JVP, no lymphadenopathy.


LUNGS:  Clear.


HEART:  S1, S2 regular.


ABDOMEN:  Benign.


EXTREMITIES:  No edema, cyanosis.


SKIN:  Unremarkable.  The patient is neurologically intact.  In particular


joint, left knee is swollen with some mild tenderness present.  There is no


erythema.  There is no open wound.


NEUROLOGIC:  The patient is neurologically alert, awake and appropriate.  No


focal neurologic deficit.





Labs


Lab





Laboratory Tests








Test


 10/16/19


12:45


 


Creatinine


 0.9 mg/dL


(0.7-1.3)


 


Estimated GFR


(Cockcroft-Gault) 94.4 











Micro





Microbiology


10/13/19 Blood Culture - Preliminary, Resulted


           NO GROWTH AFTER 1 DAY








crystal analysis neg





Objective


Assessment


1.  Monoarticular inflammation of the left knee, possibility of infection is


very high with mild cell count number, although crystal analysis pending and


culture is pending.  s/p I and D


2.  Slight fever.


3.  Leukocytosis.





Plan


Plan of Care


culture neg so far





d/c on po cipro and doxy











OMERO URRUTIA MD               Oct 17, 2019 09:27

## 2019-10-17 NOTE — NUR
SS following up with discharge planning. Walker requested for discharge to home. Walker 
received and provided to pt. Pt's RN notified.

## 2019-10-17 NOTE — NUR
Pt discharging home with self care. Discharge instructions and prescriptions discussed with 
the  phone. Pt verbalized understanding. IV's removed. Dressing changed. Walker 
provided. Pt ambulated to main entrance.

## 2019-10-17 NOTE — PDOC
PROGRESS NOTES


Chief Complaint


Chief Complaint


L Knee Pain


Left knee septic arthritis





History of Present Illness


History of Present Illness


Mr Jorge is a 39-year-old M who p/w with 1-day history of left knee swelling and

pain.  The patient denies any trauma.  Denies any twisting, turning, any injury.

 The patient also denies similar episode in the past.  The patient did not have 

any fever other than here he was noted to have 99.6, but denied any fever at 

home.  Denied any nausea, vomiting, diarrhea, chest pain, shortness of breath, 

abdominal pain, urinary symptoms or bowel symptoms.  The patient had a knee 

aspiration done in the ER, which is showing 55,000 WBC. Uric acid 8.7.





His ROM is still very limited. pain is better today. No CP or SOB. Crystal 

analysis negative. Culture negative, but significant WBC in knee fluid. D/w ID 

will need at least another day of IV antibiotics.





10/14/2019


Pt was Seen and examined.


Today pt denies pain.


Denies trauma to the left knee.


Reports knee was normal before he went to bed but when he awoke he reports the 

knee was swollen and painful.





Vitals


Vitals





Vital Signs








  Date Time  Temp Pulse Resp B/P (MAP) Pulse Ox O2 Delivery O2 Flow Rate FiO2


 


10/17/19 08:31      Room Air  


 


10/17/19 07:00 97.3 46 16 123/85 (98) 97   





 97.3       











Physical Exam


Physical Exam


GENERAL:  Alert, oriented gentleman, not in distress.


VITAL SIGNS:  Stable 


HEENT:  NAD.


NECK:  Supple, no JVP, no lymphadenopathy.


LUNGS:  Clear.


HEART:  S1, S2 regular.


ABDOMEN:  Benign.


EXTREMITIES:  No edema, cyanosis.


SKIN:  Unremarkable.  The patient is neurologically intact.  In particular


joint, left knee is swollen with some mild tenderness present.  There is no


erythema.  There is no open wound.


NEUROLOGIC:  The patient is neurologically alert, awake and appropriate.  No


focal neurologic deficit.


General:  Alert, Cooperative


Heart:  Regular rate


Lungs:  Clear


Abdomen:  Soft


Extremities:  Other (there is a left knee effusion and slight warmth. There was 

no traumatic wound, there is slight blood from the aspiration site on the 

lateral knee. There is not any focal prepatellar bursitis or bursal swelling, 

this all seems to be joint swelling/joint effusion.  The knee is tender and 

slightly warm but not erythematous. Distal light touch sensation is intact. 

Minimal motion to the knee, any motion is painful. Sensory and motor function 

seem intact but motor function is limited by pain)


Skin:  No breakdown, No significant lesion





Labs


LABS





Laboratory Tests








Test


 10/16/19


12:45


 


Creatinine


 0.9 mg/dL


(0.7-1.3)


 


Estimated GFR


(Cockcroft-Gault) 94.4 














Assessment and Plan


Assessmemt and Plan


Problems


Medical Problems:


(1) Knee pain, left


Status: Acute  











Comment


Review of Relevant


I have reviewed the following items jimmy (where applicable) has been applied.


Labs





Laboratory Tests








Test


 10/16/19


12:45


 


Creatinine


 0.9 mg/dL


(0.7-1.3)


 


Estimated GFR


(Cockcroft-Gault) 94.4 











Laboratory Tests








Test


 10/16/19


12:45


 


Creatinine


 0.9 mg/dL


(0.7-1.3)


 


Estimated GFR


(Cockcroft-Gault) 94.4 











Microbiology


10/13/19 Blood Culture - Preliminary, Resulted


           NO GROWTH AFTER 3 DAYS


10/13/19 Anaerobic/Aerobic Culture, Resulted


           Pending


10/13/19 Anaerobic Culture Result 1 (CIRA), Resulted


           Pending


10/13/19 Aerobic Culture - Preliminary, Resulted


           


10/13/19 Aerobic Culture Result 1 (CIRA) - Preliminary, Resulted


           


10/13/19 Gram Stain - Final, Resulted


           


10/13/19 Gram Stain Result 1 (CIRA) - Final, Resulted


           


10/13/19 Gram Stain Result 2 (CIRA) - Final, Resulted


Medications





Current Medications


Morphine Sulfate (Morphine Sulfate) 4 mg 1X  ONCE IV  Last administered on 

10/13/19at 16:13;  Start 10/13/19 at 16:15;  Stop 10/13/19 at 16:16;  Status DC


Sodium Chloride 1,000 ml @  1,000 mls/hr 1X  ONCE IV  Last administered on 

10/13/19at 16:15;  Start 10/13/19 at 16:00;  Stop 10/13/19 at 16:59;  Status DC


Lidocaine HCl 20 ml 1X  ONCE IJ  Last administered on 10/13/19at 16:15;  Start 

10/13/19 at 16:30;  Stop 10/13/19 at 16:31;  Status DC


Colchicine (Colcrys) 1.2 mg 1X  STAT PO  Last administered on 10/13/19at 17:58; 

Start 10/13/19 at 17:45;  Stop 10/13/19 at 17:51;  Status DC


Ketorolac Tromethamine (Toradol 30mg Vial) 30 mg 1X  ONCE IM  Last administered 

on 10/13/19at 17:58;  Start 10/13/19 at 17:45;  Stop 10/13/19 at 17:51;  Status 

DC


Vancomycin HCl (Vanco Per Pharmacy) 1 each PRN DAILY  PRN MC SEE COMMENTS Last 

administered on 10/16/19at 14:32;  Start 10/13/19 at 18:15


Naproxen (Naprosyn) 500 mg BID PO  Last administered on 10/17/19at 08:30;  Start

10/13/19 at 21:00


Acetaminophen/ Hydrocodone Bitart (Lortab 5/325) 1 tab PRN Q6HRS  PRN PO 

MODERATE-SEVERE PAIN;  Start 10/13/19 at 18:15;  Stop 10/14/19 at 13:33;  Status

DC


Acetaminophen (Tylenol) 500 mg PRN Q6HRS  PRN PO MILD PAIN / TEMP;  Start 

10/13/19 at 18:15


Ondansetron HCl (Zofran) 4 mg PRN Q6HRS  PRN IVP NAUSEA/VOMITING;  Start 

10/13/19 at 18:15


Zolpidem Tartrate (Ambien) 5 mg PRN QHS  PRN PO INSOMNIA, MAY REPEAT IN 1HR;  

Start 10/13/19 at 18:15


Morphine Sulfate (Morphine Sulfate) 2 mg PRN Q2HR  PRN IV PAIN Last administered

on 10/13/19at 21:05;  Start 10/13/19 at 18:15


Clonidine HCl (Catapres) 0.1 mg PRN Q1HR  PRN PO HYPERTENSION;  Start 10/13/19 

at 18:15


Ondansetron HCl (Zofran) 4 mg PRN Q8HRS  PRN IV NAUSEA/VOMITING;  Start 10/13/19

at 18:15;  Stop 10/14/19 at 18:14;  Status UNV


Morphine Sulfate (Morphine Sulfate) 2 mg PRN Q2HR  PRN IV PAIN;  Start 10/13/19 

at 18:15;  Stop 10/14/19 at 18:14;  Status UNV


Vancomycin HCl 1.75 gm/Sodium Chloride 500 ml @  250 mls/hr 1X  ONCE IV  Last 

administered on 10/13/19at 19:47;  Start 10/13/19 at 18:30;  Stop 10/13/19 at 

20:29;  Status DC


Colchicine (Colcrys) 0.6 mg DAILY PO ;  Start 10/14/19 at 09:00;  Stop 10/14/19 

at 13:31;  Status DC


Vancomycin HCl 1 gm/Sodium Chloride 250 ml @  250 mls/hr Q12H IV  Last admin

istered on 10/15/19at 09:08;  Start 10/14/19 at 08:00;  Stop 10/15/19 at 11:06; 

Status DC


Vancomycin HCl (Vancomycin Trough Level) 1 each 1X  ONCE MC ;  Start 10/15/19 at

07:30;  Stop 10/15/19 at 07:31;  Status DC


Diphenhydramine HCl (Benadryl) 25 mg PRN Q6HRS  PRN PO ITCHING Last administered

on 10/13/19at 22:40;  Start 10/13/19 at 22:30


Influenza Virus Vaccine Quadrival (Afluria Quad 2019-20 (3yr Up) Syringe) 0.5 ml

ONCE ONCE VAX IM  Last administered on 10/15/19at 09:13;  Start 10/14/19 at 

09:00;  Stop 10/14/19 at 09:05;  Status DC


Cefepime HCl (Maxipime) 1 gm Q12HR IVP  Last administered on 10/17/19at 08:31;  

Start 10/14/19 at 09:00


Ondansetron HCl (Zofran) 4 mg PRN Q6HRS  PRN IV NAUSEA/VOMITING;  Start 10/14/19

at 09:15;  Stop 10/14/19 at 18:00;  Status DC


Fentanyl Citrate (Fentanyl 2ml Vial) 25 mcg PRN Q5MIN  PRN IV MILD PAIN 1-3;  

Start 10/14/19 at 09:15;  Stop 10/14/19 at 18:00;  Status DC


Fentanyl Citrate (Fentanyl 2ml Vial) 50 mcg PRN Q5MIN  PRN IV MODERATE TO SEVERE

PAIN Last administered on 10/14/19at 11:57;  Start 10/14/19 at 09:15;  Stop 

10/14/19 at 18:00;  Status DC


Morphine Sulfate (Morphine Sulfate) 1 mg PRN Q10MIN  PRN IV SEVERE PAIN 7-10;  

Start 10/14/19 at 09:15;  Stop 10/14/19 at 18:00;  Status DC


Ringer's Solution 1,000 ml @  30 mls/hr Q24H IV ;  Start 10/14/19 at 09:03;  

Stop 10/14/19 at 21:02;  Status DC


Hydromorphone HCl (Dilaudid) 0.5 mg PRN Q10MIN  PRN IV SEV PAIN, Second choice; 

Start 10/14/19 at 09:15;  Stop 10/14/19 at 18:00;  Status DC


Prochlorperazine Edisylate (Compazine) 5 mg PACU PRN  PRN IV NAUSEA, MRX1;  

Start 10/14/19 at 09:15;  Stop 10/14/19 at 18:00;  Status DC


Propofol 20 ml @ As Directed STK-MED ONCE IV ;  Start 10/14/19 at 11:27;  Stop 

10/14/19 at 11:27;  Status DC


Lidocaine HCl (Lidocaine Pf 2% Vial) 5 ml STK-MED ONCE .ROUTE ;  Start 10/14/19 

at 11:27;  Stop 10/14/19 at 11:27;  Status DC


Ondansetron HCl (Zofran) 4 mg STK-MED ONCE .ROUTE ;  Start 10/14/19 at 11:27;  

Stop 10/14/19 at 11:28;  Status DC


Dexamethasone Sodium Phosphate (Decadron) 4 mg STK-MED ONCE .ROUTE ;  Start 

10/14/19 at 11:27;  Stop 10/14/19 at 11:28;  Status DC


Fentanyl Citrate (Fentanyl 2ml Vial) 100 mcg STK-MED ONCE .ROUTE ;  Start 

10/14/19 at 11:27;  Stop 10/14/19 at 11:28;  Status DC


Midazolam HCl (Versed) 2 mg STK-MED ONCE .ROUTE ;  Start 10/14/19 at 11:27;  

Stop 10/14/19 at 11:28;  Status DC


Fentanyl Citrate (Fentanyl 2ml Vial) 100 mcg STK-MED ONCE .ROUTE ;  Start 

10/14/19 at 11:51;  Stop 10/14/19 at 11:51;  Status DC


Epinephrine HCl (Adrenalin) 30 mg STK-MED ONCE .ROUTE  Last administered on 10/1

4/19at 12:45;  Start 10/14/19 at 11:53;  Stop 10/14/19 at 11:53;  Status DC


Bupivacaine HCl (Sensorcaine Mpf 0.25%) 30 ml STK-MED ONCE .ROUTE  Last 

administered on 10/14/19at 12:44;  Start 10/14/19 at 11:53;  Stop 10/14/19 at 

11:53;  Status DC


Sodium Chloride (SODIUM CHLORIDE 20ml) 20 ml STK-MED ONCE IJ ;  Start 10/14/19 

at 12:27;  Stop 10/14/19 at 12:27;  Status DC


Ketorolac Tromethamine (Toradol 30mg Vial) 30 mg STK-MED ONCE .ROUTE ;  Start 

10/14/19 at 12:46;  Stop 10/14/19 at 12:47;  Status DC


Hydromorphone HCl (Dilaudid) 2 mg STK-MED ONCE .ROUTE ;  Start 10/14/19 at 

12:48;  Stop 10/14/19 at 12:48;  Status DC


Sevoflurane (Ultane) 60 ml STK-MED ONCE IH ;  Start 10/14/19 at 12:57;  Stop 

10/14/19 at 12:57;  Status DC


Bupivacaine HCl (Sensorcaine Mpf 0.25%) 30 ml STK-MED ONCE .ROUTE  Last 

administered on 10/14/19at 13:03;  Start 10/14/19 at 13:01;  Stop 10/14/19 at 

13:01;  Status DC


Colchicine (Colcrys) 0.6 mg BID PO  Last administered on 10/17/19at 08:30;  

Start 10/14/19 at 21:00;  Stop 10/28/19 at 20:59


Oxycodone/ Acetaminophen (Percocet 5/325) 1 tab PRN Q4HRS  PRN PO MODERATE PAIN;

 Start 10/14/19 at 13:15


Oxycodone/ Acetaminophen (Percocet 5/325) 2 tab PRN Q4HRS  PRN PO SEVERE PAIN 

Last administered on 10/17/19at 08:31;  Start 10/14/19 at 13:45


Vancomycin HCl 1.25 gm/Sodium Chloride 250 ml @  167 mls/hr Q8H IV  Last 

administered on 10/17/19at 08:32;  Start 10/15/19 at 17:00


Lactobacillus Rhamnosus (Culturelle) 1 cap BID PO  Last administered on 

10/17/19at 08:30;  Start 10/15/19 at 21:00





Active Scripts


Active


Norco 5-325 Tablet (Acetaminophen/Hydrocodone Bitart) 1 Each Tablet 1 Tab PO PRN

Q6HRS PRN 3 Days


Vitals/I & O





Vital Sign - Last 24 Hours








 10/16/19 10/16/19 10/16/19 10/16/19





 11:00 15:00 19:00 19:40


 


Temp 98.0 98.0 98.2 





 98.0 98.0 98.2 


 


Pulse 54 58 66 


 


Resp 20 20 18 


 


B/P (MAP) 122/69 (86) 112/64 (80) 115/73 (87) 


 


Pulse Ox 98 99 97 


 


O2 Delivery Room Air Room Air Room Air Room Air


 


    





    





 10/16/19 10/16/19 10/17/19 10/17/19





 20:43 23:00 00:37 03:00


 


Temp  97.9  97.9





  97.9  97.9


 


Pulse  58  63


 


Resp  18  18


 


B/P (MAP)  105/63 (77)  112/75 (87)


 


Pulse Ox  96  98


 


O2 Delivery Room Air Room Air Room Air Room Air


 


    





    





 10/17/19 10/17/19 10/17/19 





 07:00 08:00 08:31 


 


Temp 97.3   





 97.3   


 


Pulse 46   


 


Resp 16   


 


B/P (MAP) 123/85 (98)   


 


Pulse Ox 97   


 


O2 Delivery Room Air Room Air Room Air 














Intake and Output   


 


 10/16/19 10/16/19 10/17/19





 15:00 23:00 07:00


 


Intake Total 770 ml 250 ml 200 ml


 


Balance 770 ml 250 ml 200 ml

















TOY CUELLAR MD        Oct 17, 2019 09:02

## 2019-11-25 ENCOUNTER — HOSPITAL ENCOUNTER (INPATIENT)
Dept: HOSPITAL 61 - ER | Age: 39
LOS: 2 days | Discharge: HOME | DRG: 554 | End: 2019-11-27
Attending: INTERNAL MEDICINE | Admitting: INTERNAL MEDICINE
Payer: SELF-PAY

## 2019-11-25 VITALS — BODY MASS INDEX: 25.23 KG/M2 | HEIGHT: 66 IN | WEIGHT: 157 LBS

## 2019-11-25 VITALS — DIASTOLIC BLOOD PRESSURE: 84 MMHG | SYSTOLIC BLOOD PRESSURE: 113 MMHG

## 2019-11-25 VITALS — DIASTOLIC BLOOD PRESSURE: 78 MMHG | SYSTOLIC BLOOD PRESSURE: 115 MMHG

## 2019-11-25 VITALS — SYSTOLIC BLOOD PRESSURE: 123 MMHG | DIASTOLIC BLOOD PRESSURE: 87 MMHG

## 2019-11-25 DIAGNOSIS — M17.12: ICD-10-CM

## 2019-11-25 DIAGNOSIS — M11.9: ICD-10-CM

## 2019-11-25 DIAGNOSIS — Z79.899: ICD-10-CM

## 2019-11-25 DIAGNOSIS — M10.9: Primary | ICD-10-CM

## 2019-11-25 DIAGNOSIS — Z83.3: ICD-10-CM

## 2019-11-25 LAB
ALBUMIN SERPL-MCNC: 4.2 G/DL (ref 3.4–5)
ALBUMIN/GLOB SERPL: 1.1 {RATIO} (ref 1–1.7)
ALP SERPL-CCNC: 125 U/L (ref 46–116)
ALT SERPL-CCNC: 43 U/L (ref 16–63)
ANION GAP SERPL CALC-SCNC: 12 MMOL/L (ref 6–14)
AST SERPL-CCNC: 23 U/L (ref 15–37)
BASOPHILS # BLD AUTO: 0.1 X10^3/UL (ref 0–0.2)
BASOPHILS NFR BLD: 1 % (ref 0–3)
BILIRUB SERPL-MCNC: 0.6 MG/DL (ref 0.2–1)
BUN SERPL-MCNC: 17 MG/DL (ref 8–26)
BUN/CREAT SERPL: 19 (ref 6–20)
CALCIUM SERPL-MCNC: 8.9 MG/DL (ref 8.5–10.1)
CHLORIDE SERPL-SCNC: 100 MMOL/L (ref 98–107)
CO2 SERPL-SCNC: 27 MMOL/L (ref 21–32)
CREAT SERPL-MCNC: 0.9 MG/DL (ref 0.7–1.3)
EOSINOPHIL NFR BLD: 0.2 X10^3/UL (ref 0–0.7)
EOSINOPHIL NFR BLD: 2 % (ref 0–3)
ERYTHROCYTE [DISTWIDTH] IN BLOOD BY AUTOMATED COUNT: 13.6 % (ref 11.5–14.5)
GFR SERPLBLD BASED ON 1.73 SQ M-ARVRAT: 94.4 ML/MIN
GLOBULIN SER-MCNC: 3.7 G/DL (ref 2.2–3.8)
GLUCOSE SERPL-MCNC: 100 MG/DL (ref 70–99)
HCT VFR BLD CALC: 45.1 % (ref 39–53)
HGB BLD-MCNC: 15.7 G/DL (ref 13–17.5)
LYMPHOCYTES # BLD: 1.6 X10^3/UL (ref 1–4.8)
LYMPHOCYTES NFR BLD AUTO: 16 % (ref 24–48)
MCH RBC QN AUTO: 31 PG (ref 25–35)
MCHC RBC AUTO-ENTMCNC: 35 G/DL (ref 31–37)
MCV RBC AUTO: 89 FL (ref 79–100)
MONO #: 0.9 X10^3/UL (ref 0–1.1)
MONOCYTES NFR BLD: 9 % (ref 0–9)
NEUT #: 7.5 X10^3/UL (ref 1.8–7.7)
NEUTROPHILS NFR BLD AUTO: 73 % (ref 31–73)
PLATELET # BLD AUTO: 274 X10^3/UL (ref 140–400)
POTASSIUM SERPL-SCNC: 3.8 MMOL/L (ref 3.5–5.1)
PROT SERPL-MCNC: 7.9 G/DL (ref 6.4–8.2)
RBC # BLD AUTO: 5.08 X10^6/UL (ref 4.3–5.7)
SODIUM SERPL-SCNC: 139 MMOL/L (ref 136–145)
WBC # BLD AUTO: 10.3 X10^3/UL (ref 4–11)

## 2019-11-25 PROCEDURE — G0378 HOSPITAL OBSERVATION PER HR: HCPCS

## 2019-11-25 PROCEDURE — 87040 BLOOD CULTURE FOR BACTERIA: CPT

## 2019-11-25 PROCEDURE — 36415 COLL VENOUS BLD VENIPUNCTURE: CPT

## 2019-11-25 PROCEDURE — 93971 EXTREMITY STUDY: CPT

## 2019-11-25 PROCEDURE — 73562 X-RAY EXAM OF KNEE 3: CPT

## 2019-11-25 PROCEDURE — 84550 ASSAY OF BLOOD/URIC ACID: CPT

## 2019-11-25 PROCEDURE — 86140 C-REACTIVE PROTEIN: CPT

## 2019-11-25 PROCEDURE — G0379 DIRECT REFER HOSPITAL OBSERV: HCPCS

## 2019-11-25 PROCEDURE — 83605 ASSAY OF LACTIC ACID: CPT

## 2019-11-25 PROCEDURE — 85025 COMPLETE CBC W/AUTO DIFF WBC: CPT

## 2019-11-25 PROCEDURE — 96375 TX/PRO/DX INJ NEW DRUG ADDON: CPT

## 2019-11-25 PROCEDURE — 80053 COMPREHEN METABOLIC PANEL: CPT

## 2019-11-25 PROCEDURE — 85651 RBC SED RATE NONAUTOMATED: CPT

## 2019-11-25 PROCEDURE — 96361 HYDRATE IV INFUSION ADD-ON: CPT

## 2019-11-25 PROCEDURE — 96374 THER/PROPH/DIAG INJ IV PUSH: CPT

## 2019-11-25 RX ADMIN — MORPHINE SULFATE PRN MG: 2 INJECTION, SOLUTION INTRAMUSCULAR; INTRAVENOUS at 21:42

## 2019-11-25 RX ADMIN — COLCHICINE SCH MG: 0.6 TABLET, FILM COATED ORAL at 21:37

## 2019-11-25 NOTE — RAD
KNEE LEFT 3V

 

History: Pain and tenderness

 

Comparison: October 13, 2019

 

Findings:

3 views of the left knee are submitted. No acute fracture or dislocation 

is identified. There is probable small suprapatellar joint effusion. 

Medial femoral condylar articular surface appears somewhat more sclerotic.

 

 

Impression: 

 

1.  No acute osseous abnormality is identified by radiographs. There is 

small suprapatellar joint effusion. Medial femoral condylar articular 

surface appears somewhat more sclerotic, nonspecific.

 

Electronically signed by: Neto Lindsey MD (11/25/2019 2:30 PM) 

[id number]

## 2019-11-25 NOTE — PHYS DOC
Past Medical History


Past Medical History:  No Pertinent History


 (KAILYN LAFLEUR)


Past Surgical History:  No Surgical History


 (KAILYN LAFLEUR)


Alcohol Use:  Heavy


Drug Use:  None


 (KAILYN LAFLEUR)





Adult General


Chief Complaint


Chief Complaint:  KNEE SWELLING





[facility]


HPI





Patient is a 39  year old male who presents with left knee pain has been ongoing

for several days. The patient had an oxycodone at 9:00. States his pain has been

10 out of 10 in severity sharp. Denies any trauma to the knee. Saw the patient 

on  and performed a knee aspiration at that time and admitted 

patient for septic joint.


 (KAILYN LAFLEUR)





Review of Systems


Review of Systems





Constitutional: Reports fever or chills []


Eyes: Denies change in visual acuity, redness, or eye pain []


HENT: Denies nasal congestion or sore throat []


Respiratory: Denies cough or shortness of breath []


Cardiovascular: No additional information not addressed in HPI []


GI: Denies abdominal pain, nausea, vomiting, bloody stools or diarrhea []


: Denies dysuria or hematuria []


Musculoskeletal: Reports L knee pain.


Integument: Denies rash or skin lesions []


Neurologic: Denies headache, focal weakness or sensory changes []


Endocrine: Denies polyuria or polydipsia []





Complete systems were reviewed and found to be within normal limits, except as 

documented in this note.


 (KAILYN LAFLEUR)





Current Medications


Current Medications





Current Medications








 Medications


  (Trade)  Dose


 Ordered  Sig/Chantelle  Start Time


 Stop Time Status Last Admin


Dose Admin


 


 Hydromorphone HCl


  (Dilaudid)  1 mg  1X  11/25/19 16:30


     





 


 Lidocaine HCl


  (Lidocaine 1%


 20ml Vial)  20 ml  1X  STAT  19 16:19


 19 16:21 DC  





 


 Morphine Sulfate


  (Morphine


 Sulfate)  5 mg  1X  STAT  19 13:46


 19 13:51 DC 19 14:04


5 MG


 


 Sodium Chloride  1,000 ml @ 


 1,000 mls/hr  1X  STAT  19 13:46


 19 14:45 DC 19 14:05


1,000 MLS/HR





 (J CARLOS PERALTA MD)





Allergies


Allergies





Allergies








Coded Allergies Type Severity Reaction Last Updated Verified


 


  No Known Drug Allergies    16 No





 (J CARLOS PERALTA MD)





Physical Exam


Physical Exam





Constitutional: Well developed, well nourished, no acute distress, non-toxic 

appearance. []


HENT: Normocephalic, atraumatic, bilateral external ears normal, oropharynx 

moist, no oral exudates, nose normal. []


Eyes: PERRLA, EOMI, conjunctiva normal, no discharge. [] 


Neck: Normal range of motion, no tenderness, supple, no stridor. [] 


Cardiovascular:Heart rate regular rhythm, no murmur []


Lungs & Thorax:  Bilateral breath sounds clear to auscultation []


Abdomen: Bowel sounds normal, soft, no tenderness, no masses, no pulsatile 

masses. [] 


Skin: Warm, dry, no erythema, no rash. [] 


Back: No tenderness, no CVA tenderness. [] 


Extremities: No tenderness, no cyanosis, no clubbing, ROM intact, no edema. [] 


Neurologic: Alert and oriented X 3, normal motor function, normal sensory 

function, no focal deficits noted. []


Psychologic: Affect normal, judgement normal, mood normal. []


 (KAILYN LAFLEUR)





Current Patient Data


Vital Signs





                                   Vital Signs








  Date Time  Temp Pulse Resp B/P (MAP) Pulse Ox O2 Delivery O2 Flow Rate FiO2


 


19 14:04   16  96 Room Air  


 


19 13:45 98.9 99  137/91 (106)    





 98.9       





 (J CARLOS PERALTA MD)


Lab Values





                                Laboratory Tests








Test


 19


13:50


 


White Blood Count


 10.3 x10^3/uL


(4.0-11.0)


 


Red Blood Count


 5.08 x10^6/uL


(4.30-5.70)


 


Hemoglobin


 15.7 g/dL


(13.0-17.5)


 


Hematocrit


 45.1 %


(39.0-53.0)


 


Mean Corpuscular Volume


 89 fL ()





 


Mean Corpuscular Hemoglobin 31 pg (25-35)  


 


Mean Corpuscular Hemoglobin


Concent 35 g/dL


(31-37)


 


Red Cell Distribution Width


 13.6 %


(11.5-14.5)


 


Platelet Count


 274 x10^3/uL


(140-400)


 


Neutrophils (%) (Auto) 73 % (31-73)  


 


Lymphocytes (%) (Auto) 16 % (24-48)  L


 


Monocytes (%) (Auto) 9 % (0-9)  


 


Eosinophils (%) (Auto) 2 % (0-3)  


 


Basophils (%) (Auto) 1 % (0-3)  


 


Neutrophils # (Auto)


 7.5 x10^3/uL


(1.8-7.7)


 


Lymphocytes # (Auto)


 1.6 x10^3/uL


(1.0-4.8)


 


Monocytes # (Auto)


 0.9 x10^3/uL


(0.0-1.1)


 


Eosinophils # (Auto)


 0.2 x10^3/uL


(0.0-0.7)


 


Basophils # (Auto)


 0.1 x10^3/uL


(0.0-0.2)


 


Erythrocyte Sedimentation Rate 6 (0-15)  


 


Sodium Level


 139 mmol/L


(136-145)


 


Potassium Level


 3.8 mmol/L


(3.5-5.1)


 


Chloride Level


 100 mmol/L


()


 


Carbon Dioxide Level


 27 mmol/L


(21-32)


 


Anion Gap 12 (6-14)  


 


Blood Urea Nitrogen


 17 mg/dL


(8-26)


 


Creatinine


 0.9 mg/dL


(0.7-1.3)


 


Estimated GFR


(Cockcroft-Gault) 94.4  





 


BUN/Creatinine Ratio 19 (6-20)  


 


Glucose Level


 100 mg/dL


(70-99)  H


 


Lactic Acid Level


 1.1 mmol/L


(0.4-2.0)


 


Uric Acid


 9.7 mg/dL


(3.5-7.2)  H


 


Calcium Level


 8.9 mg/dL


(8.5-10.1)


 


Total Bilirubin


 0.6 mg/dL


(0.2-1.0)


 


Aspartate Amino Transferase


(AST) 23 U/L (15-37)





 


Alanine Aminotransferase (ALT)


 43 U/L (16-63)





 


Alkaline Phosphatase


 125 U/L


()  H


 


Total Protein


 7.9 g/dL


(6.4-8.2)


 


Albumin


 4.2 g/dL


(3.4-5.0)


 


Albumin/Globulin Ratio 1.1 (1.0-1.7)  





                                Laboratory Tests


19 13:50








                                Laboratory Tests


19 13:50








 (J CARLOS PERALTA MD)


Lab Values





                                Laboratory Tests








Test


 19


13:50


 


White Blood Count


 10.3 x10^3/uL


(4.0-11.0)


 


Red Blood Count


 5.08 x10^6/uL


(4.30-5.70)


 


Hemoglobin


 15.7 g/dL


(13.0-17.5)


 


Hematocrit


 45.1 %


(39.0-53.0)


 


Mean Corpuscular Volume


 89 fL ()





 


Mean Corpuscular Hemoglobin 31 pg (25-35)  


 


Mean Corpuscular Hemoglobin


Concent 35 g/dL


(31-37)


 


Red Cell Distribution Width


 13.6 %


(11.5-14.5)


 


Platelet Count


 274 x10^3/uL


(140-400)


 


Neutrophils (%) (Auto) 73 % (31-73)  


 


Lymphocytes (%) (Auto) 16 % (24-48)  L


 


Monocytes (%) (Auto) 9 % (0-9)  


 


Eosinophils (%) (Auto) 2 % (0-3)  


 


Basophils (%) (Auto) 1 % (0-3)  


 


Neutrophils # (Auto)


 7.5 x10^3/uL


(1.8-7.7)


 


Lymphocytes # (Auto)


 1.6 x10^3/uL


(1.0-4.8)


 


Monocytes # (Auto)


 0.9 x10^3/uL


(0.0-1.1)


 


Eosinophils # (Auto)


 0.2 x10^3/uL


(0.0-0.7)


 


Basophils # (Auto)


 0.1 x10^3/uL


(0.0-0.2)


 


Erythrocyte Sedimentation Rate 6 (0-15)  


 


Sodium Level


 139 mmol/L


(136-145)


 


Potassium Level


 3.8 mmol/L


(3.5-5.1)


 


Chloride Level


 100 mmol/L


()


 


Carbon Dioxide Level


 27 mmol/L


(21-32)


 


Anion Gap 12 (6-14)  


 


Blood Urea Nitrogen


 17 mg/dL


(8-26)


 


Creatinine


 0.9 mg/dL


(0.7-1.3)


 


Estimated GFR


(Cockcroft-Gault) 94.4  





 


BUN/Creatinine Ratio 19 (6-20)  


 


Glucose Level


 100 mg/dL


(70-99)  H


 


Lactic Acid Level


 1.1 mmol/L


(0.4-2.0)


 


Uric Acid


 9.7 mg/dL


(3.5-7.2)  H


 


Calcium Level


 8.9 mg/dL


(8.5-10.1)


 


Total Bilirubin


 0.6 mg/dL


(0.2-1.0)


 


Aspartate Amino Transferase


(AST) 23 U/L (15-37)





 


Alanine Aminotransferase (ALT)


 43 U/L (16-63)





 


Alkaline Phosphatase


 125 U/L


()  H


 


C-Reactive Protein,


Quantitative 13.8 mg/L


(0-3.3)  H


 


Total Protein


 7.9 g/dL


(6.4-8.2)


 


Albumin


 4.2 g/dL


(3.4-5.0)


 


Albumin/Globulin Ratio 1.1 (1.0-1.7)  





                                Laboratory Tests


19 13:50








                                Laboratory Tests


19 13:50








 (KAILYN LAFLEUR)





EKG


EKG


[]


 (KAILYN LAFLEUR)





Radiology/Procedures


Radiology/Procedures





                            [id number] [place] [address]


                                 [phone]


                                        


                                 IMAGING REPORT





                                     Signed





PATIENT: KAT COOPERACCOUNT: [id number]     MRN#: [id number]


: 1980           LOCATION: ER              AGE: 39


SEX: M                    EXAM DT: 19         ACCESSION#: [id number]


STATUS: REG ER            ORD. PHYSICIAN: KAILYN LAFLEUR


REASON: LLE PAIN AND EDEMA


PROCEDURE: VENOUS LOWER EXTREMITY LEFT





Left lower extremity venous real time grayscale, color and spectral duplex


ultrasound was performed. 


 


History: Left lower extremity pain and edema


 


Comparison: None


 


The left common femoral, femoral, and popliteal veins   demonstrate 


anechoic lumina, full compressibility, augmentable waveforms, and cephalad


color Doppler flow.  The posterior tibial  are also patent. Normal flow is


also seen in the cephalad portion of the saphenous vein.


 


Impression: No evidence of DVT in the left lower extremity. 


 


Electronically signed by: Torito Freitas MD (2019 3:54 PM) [id number]














DICTATED and SIGNED BY:     TORITO FREITAS MD


DATE:     19 1554

















[][facility]


                    [address] [place], [place] [id number] [phone]


                                        


                                 IMAGING REPORT





                                     Signed





PATIENT: GIBRAN COOPERRIZWANAACCOUNT: [id number]     MRN#: [id number]


: 1980           LOCATION: ER              AGE: 39


SEX: M                    EXAM DT: 19         ACCESSION#: [id number]


STATUS: REG ER            ORD. PHYSICIAN: KAILYN LAFLEUR


REASON: pain, tenderness, LROM, pain in posterior knee


PROCEDURE: KNEE LEFT 3V





KNEE LEFT 3V


 


History: Pain and tenderness


 


Comparison: 2019


 


Findings:


3 views of the left knee are submitted. No acute fracture or dislocation 


is identified. There is probable small suprapatellar joint effusion. 


Medial femoral condylar articular surface appears somewhat more sclerotic.


 


 


Impression: 


 


1.  No acute osseous abnormality is identified by radiographs. There is 


small suprapatellar joint effusion. Medial femoral condylar articular 


surface appears somewhat more sclerotic, nonspecific.


 


Electronically signed by: Rylan Segovia MD (2019 2:30 PM) 


[id number]














DICTATED and SIGNED BY:     RYLAN SEGOVIA MD


DATE:     19 1430


 (KAILYN LAFLEUR)





Course & Med Decision Making


Course & Med Decision Making


Pertinent Labs and Imaging studies reviewed. (See chart for details)





Will get labs, x-ray, knee aspiration, and give supportive care.





Dr. Peralta discussed case at 1645 with Dr. Gooden who accepted admission. 

Consulted Ortho. Also ordered Indomethacin, and Colchicine. 


 (KAILYN LAFLEUR)


Course & Med Decision Making


ER PHYSICIAN ATTENDING NOTE: I have personally seen and examined the patient, 

and agree with the history, physical exam, and plan, as documented by mid-level 

provider.


I attempted a knee aspiration was unsuccessful. The anterior edema is minimal, 

most of the pain is posterior. The patient has almost no range of motion of his 

knee secondary to pain, exam would be consistent with a septic arthritis except 

there is not significant anterior or lateral tenderness or significant edema. I 

spoke with Dr. Pleitez, orthopedics, who I have asked to come see the patient. I 

did review the patient's records from his recent hospitalization where he had a 

presumed septic arthritis, although cultures and crystals were both negative.








4:30 PM: I spoke with Dr. Pleitez after failed arthrocentesis. We discussed the 

differential diagnosis which includes inflammatory, gouty, and infectious 

arthritis. Although infectious arthritis cannot be excluded, inflammatory or 

gouty arthritis are considered more likely at this point. We discussed the plan 

of management, and was felt that overnight observation, possible repeat atte

mpted aspiration in the morning, with treatment with anti-inflammatories was 

most appropriate at this time. Dr. Pleitez will see the patient in the hospital 

and consult on the patient will be admitted to the hospitalist for further 

evaluation.


 (J CARLOS PERALTA MD)


Dragon Disclaimer


Dragon Disclaimer


This electronic medical record was generated, in whole or in part, using a voice

 recognition dictation system.


 (KAILYN LAFLEUR)





Departure


Departure


Impression:  


   Primary Impression:  


   Arthritis of knee, left


Disposition:   ADMITTED AS INPATIENT


Admitting Physician:  HIMS


 (KAILYN LAFLEUR)


Condition:  STABLE


Referrals:  


NO PCP (PCP)











KAILYN LAFLEUR          2019 13:59


J CARLOS PERALTA MD           2019 16:20

## 2019-11-25 NOTE — NUR
PT STATES THAT HE DOES NOT HAVE ANY PAST MEDICAL HISTORY OTHER THAN SAME ISSUE WITH KNEE 
ABOUT 6 WEEKS AGO AND DOES NOT TAKE ANY HOME MEDICATIONS OTHER THAN PAIN MEDICATION.

## 2019-11-25 NOTE — PDOC1
History and Physical


Date of Admission


Date of Admission


DATE: 11/25/19 


TIME: 17:03





Identification/Chief Complaint


Chief Complaint


Left knee pain





Source


Source:  Patient





History of Present Illness


History of Present Illness


Mr Jorge is a 39-year-old M who p/w with 1-day history of left knee swelling and

pain.  The patient denies any trauma.  Denies any twisting, turning, any injury.

 The patient also denies similar episode in the past.  The patient did not have 

any fever other than here he was noted to have 99.6, but denied any fever at 

home.  Denied any nausea, vomiting, diarrhea, chest pain, shortness of breath, 

abdominal pain, urinary symptoms or bowel symptoms.  The patient had a knee 

aspiration done in the ER 5 weeks ago, which is showing 55,000 WBC. Uric acid 

8.7.





His ROM is very limited. Pain is worse today. No CP or SOB. Crystal analysis 

negative. Culture negative, but significant WBC in knee fluid. Possibly gout 

flare, admitted for inability to ambulate





Past Medical History


Cardiovascular:  No pertinent hx


Pulmonary:  No pertinent hx


GI:  No pertinent hx


Heme/Onc:  No pertinent hx


Hepatobiliary:  No pertinent hx


Psych:  No pertinent hx


Rheumatologic:  No pertinent hx


Infectious disease:  No pertinent hx


Renal/:  No pertinent hx


Endocrine:  No pertinent hx





Past Surgical History


Past Surgical History:  No pertinent history





Family History


Family History:  Diabetes





Social History


Smoke:  No


ALCOHOL:  none


Drugs:  None





Current Problem List


Problem List


Problems


Medical Problems:


(1) Arthritis of knee, left


Status: Acute  











Current Medications


Current Medications





Current Medications


Lidocaine HCl 20 ml 1X  STAT IJ  Last administered on 11/25/19at 14:05;  Start 

11/25/19 at 13:44;  Stop 11/25/19 at 13:46;  Status DC


Sodium Chloride 1,000 ml @  1,000 mls/hr 1X  STAT IV  Last administered on 

11/25/19at 14:05;  Start 11/25/19 at 13:46;  Stop 11/25/19 at 14:45;  Status DC


Morphine Sulfate (Morphine Sulfate) 5 mg 1X  STAT IV  Last administered on 

11/25/19at 14:04;  Start 11/25/19 at 13:46;  Stop 11/25/19 at 13:51;  Status DC


Hydromorphone HCl (Dilaudid) 1 mg 1X IVP  Last administered on 11/25/19at 16:28;

 Start 11/25/19 at 16:30


Lidocaine HCl (Lidocaine 1% 20ml Vial) 20 ml 1X  STAT INJ  Last administered on 

11/25/19at 16:29;  Start 11/25/19 at 16:19;  Stop 11/25/19 at 16:21;  Status DC


Indomethacin (Indocin) 50 mg TID PO ;  Start 11/25/19 at 16:30


Colchicine (Colcrys) 1.2 mg 1X  STAT PO ;  Start 11/25/19 at 16:29;  Stop 

11/25/19 at 16:34;  Status DC


Colchicine (Colcrys) 0.6 mg BID PO ;  Start 11/25/19 at 21:00


Ondansetron HCl (Zofran) 4 mg PRN Q8HRS  PRN IV NAUSEA/VOMITING;  Start 11/25/19

at 17:00;  Stop 11/26/19 at 16:59


Morphine Sulfate (Morphine Sulfate) 2 mg PRN Q2HR  PRN IV PAIN;  Start 11/25/19 

at 17:00;  Stop 11/26/19 at 16:59





Active Scripts


Active


Doxycycline Hyclate 100 Mg Tablet 100 Mg PO BID 10 Days


     Can sub with any Doxycycline formulation for patient cost


Cipro (Ciprofloxacin Hcl) 250 Mg Tablet 500 Mg PO BID 10 Days


     Can sub with 500mg Tabs #20


Norco 5-325 Tablet (Acetaminophen/Hydrocodone Bitart) 1 Each Tablet 1 Tab PO PRN

Q6HRS PRN 6 Days





Allergies


Allergies:  


Coded Allergies:  


     No Known Drug Allergies (Unverified , 4/20/16)





ROS


General:  No: Chills, Night Sweats, Fatigue, Malaise, Appetite, Other


PSYCHOLOGICAL ROS:  No: Anxiety, Behavioral Disorder, Concentration difficultie,

Decreased libido, Depression, Disorientation, Hallucinations, Hostility, 

Irritablity, Memory difficulties, Mood Swings, Obsessive thoughts, Physical 

abuse, Sexual abuse, Sleep disturbances, Suicidal ideation, Other


Eyes:  No Blurry vision, No Decreased vision, No Double vision, No Dry eyes, No 

Excessive tearing, No Eye Pain, No Itchy Eyes, No Loss of vision, No 

Photophobia, No Scotomata, No Uses contacts, No Uses glasses, No Other


HEENT:  No: Heacaches, Visual Changes, Hearing change, Nasal congestion, Nasal 

discharge, Oral lesions, Sinus pain, Sore Throat, Epistaxis, Sneezing, Snoring, 

Tinnitus, Vertigo, Vocal changes, Other


ALLERGY AND IMMUNOLOGY:  No: Hives, Insect Bite Sensitivity, Itchy/Watery Eyes, 

Nasal Congestion, Post Nasal Drip, Seasonal Allergies, Other


Hematological and Lymphatic:  No: Bleeding Problems, Blood Clots, Blood 

Transfusions, Brusing, Night Sweats, Pallor, Swollen Lymph Nodes, Other


ENDOCRINE:  No: Breast Changes, Galactorrhea, Hair Pattern Changes, Hot Flashes,

Malaise/lethargy, Mood Swings, Palpitations, Polydipsia/polyuria, Skin Changes, 

Temperature Intolerance, Unexpected Weight Changes, Other


Breast:  No New/Changing Breast Lumps, No Nipple changes, No Nipple discharge, 

No Other


Respiratory:  No: Cough, Hemoptysis, Orthopnea, Pleuritic Pain, Shortness of 

breath, SOB with excertion, Sputum Changes, Stridor, Tachypnea, Wheezing, Other


Cardiovascular:  No Chest Pain, No Palpitations, No Orthopnea, No Paroxysmal 

Noc. Dyspnea, No Edema, No Lt Headedness, No Other


Gastrointestinal:  No Nausea, No Vomiting, No Abdominal Pain, No Diarrhea, No 

Constipation, No Melena, No Hematochezia, No Other


Genitourinary:  No Dysuria, No Frequency, No Incontinence, No Hematuria, No 

Retention, No Discharge, No Urgency, No Pain, No Flank Pain, No Other, No , No ,

No , No , No , No , No 


Musculoskeletal:  Yes Gait Disturbance, Yes Joint Pain, Yes Joint Stiffness, Yes

Joint Swelling; 


   No Muscle Pain, No Muscular Weakness, No Pain In:, No Swelling In:, No Other


Neurological:  No Behavorial Changes, No Bowel/Bladder ControlChng, No 

Confusion, No Dizziness, No Gait Disturbance, No Headaches, No Impaired 

Coord/balance, No Memory Loss, No Numbness/Tingling, No Seizures, No Speech 

Problems, No Tremors, No Visual Changes, No Weakness, No Other


Skin:  No Dry Skin, No Eczema, No Hair Changes, No Lumps, No Mole Changes, No 

Mottling, No Nail Changes, No Pruritus, No Rash, No Skin Lesion Changes, No 

Other, No Acne





Physical Exam


General:  Alert, Oriented X3, Cooperative, No acute distress


HEENT:  Atraumatic, PERRLA, EOMI, Mucous membr. moist/pink


Lungs:  Clear to auscultation, Normal air movement


Heart:  S1S2, RRR, no thrills, no rubs, no gallops, no murmurs


Abdomen:  Normal bowel sounds, Soft, No tenderness, No hepatosplenomegaly, No 

masses


Rectal Exam:  not examined


Extremities:  No clubbing, No cyanosis, No edema, Normal pulses, Other (Left 

knee stiff, warm, no erythema)


Skin:  No rashes, No breakdown, No significant lesion


Neuro:  Normal speech, Strength at 5/5 X4 ext, Normal tone, Sensation intact, 

Cranial nerves 3-12 NL, Reflexes 2+


Psych/Mental Status:  Mental status NL, Mood NL





Vitals


Vitals





Vital Signs








  Date Time  Temp Pulse Resp B/P (MAP) Pulse Ox O2 Delivery O2 Flow Rate FiO2


 


11/25/19 16:28   16  95 Room Air  


 


11/25/19 13:45 98.9 99  137/91 (106)    





 98.9       











Labs


Labs





Laboratory Tests








Test


 11/25/19


13:50


 


White Blood Count


 10.3 x10^3/uL


(4.0-11.0)


 


Red Blood Count


 5.08 x10^6/uL


(4.30-5.70)


 


Hemoglobin


 15.7 g/dL


(13.0-17.5)


 


Hematocrit


 45.1 %


(39.0-53.0)


 


Mean Corpuscular Volume 89 fL () 


 


Mean Corpuscular Hemoglobin 31 pg (25-35) 


 


Mean Corpuscular Hemoglobin


Concent 35 g/dL


(31-37)


 


Red Cell Distribution Width


 13.6 %


(11.5-14.5)


 


Platelet Count


 274 x10^3/uL


(140-400)


 


Neutrophils (%) (Auto) 73 % (31-73) 


 


Lymphocytes (%) (Auto) 16 % (24-48) 


 


Monocytes (%) (Auto) 9 % (0-9) 


 


Eosinophils (%) (Auto) 2 % (0-3) 


 


Basophils (%) (Auto) 1 % (0-3) 


 


Neutrophils # (Auto)


 7.5 x10^3/uL


(1.8-7.7)


 


Lymphocytes # (Auto)


 1.6 x10^3/uL


(1.0-4.8)


 


Monocytes # (Auto)


 0.9 x10^3/uL


(0.0-1.1)


 


Eosinophils # (Auto)


 0.2 x10^3/uL


(0.0-0.7)


 


Basophils # (Auto)


 0.1 x10^3/uL


(0.0-0.2)


 


Erythrocyte Sedimentation Rate 6 (0-15) 


 


Sodium Level


 139 mmol/L


(136-145)


 


Potassium Level


 3.8 mmol/L


(3.5-5.1)


 


Chloride Level


 100 mmol/L


()


 


Carbon Dioxide Level


 27 mmol/L


(21-32)


 


Anion Gap 12 (6-14) 


 


Blood Urea Nitrogen


 17 mg/dL


(8-26)


 


Creatinine


 0.9 mg/dL


(0.7-1.3)


 


Estimated GFR


(Cockcroft-Gault) 94.4 





 


BUN/Creatinine Ratio 19 (6-20) 


 


Glucose Level


 100 mg/dL


(70-99)


 


Lactic Acid Level


 1.1 mmol/L


(0.4-2.0)


 


Uric Acid


 9.7 mg/dL


(3.5-7.2)


 


Calcium Level


 8.9 mg/dL


(8.5-10.1)


 


Total Bilirubin


 0.6 mg/dL


(0.2-1.0)


 


Aspartate Amino Transf


(AST/SGOT) 23 U/L (15-37) 





 


Alanine Aminotransferase


(ALT/SGPT) 43 U/L (16-63) 





 


Alkaline Phosphatase


 125 U/L


()


 


C-Reactive Protein,


Quantitative 13.8 mg/L


(0-3.3)


 


Total Protein


 7.9 g/dL


(6.4-8.2)


 


Albumin


 4.2 g/dL


(3.4-5.0)


 


Albumin/Globulin Ratio 1.1 (1.0-1.7) 








Laboratory Tests








Test


 11/25/19


13:50


 


White Blood Count


 10.3 x10^3/uL


(4.0-11.0)


 


Red Blood Count


 5.08 x10^6/uL


(4.30-5.70)


 


Hemoglobin


 15.7 g/dL


(13.0-17.5)


 


Hematocrit


 45.1 %


(39.0-53.0)


 


Mean Corpuscular Volume 89 fL () 


 


Mean Corpuscular Hemoglobin 31 pg (25-35) 


 


Mean Corpuscular Hemoglobin


Concent 35 g/dL


(31-37)


 


Red Cell Distribution Width


 13.6 %


(11.5-14.5)


 


Platelet Count


 274 x10^3/uL


(140-400)


 


Neutrophils (%) (Auto) 73 % (31-73) 


 


Lymphocytes (%) (Auto) 16 % (24-48) 


 


Monocytes (%) (Auto) 9 % (0-9) 


 


Eosinophils (%) (Auto) 2 % (0-3) 


 


Basophils (%) (Auto) 1 % (0-3) 


 


Neutrophils # (Auto)


 7.5 x10^3/uL


(1.8-7.7)


 


Lymphocytes # (Auto)


 1.6 x10^3/uL


(1.0-4.8)


 


Monocytes # (Auto)


 0.9 x10^3/uL


(0.0-1.1)


 


Eosinophils # (Auto)


 0.2 x10^3/uL


(0.0-0.7)


 


Basophils # (Auto)


 0.1 x10^3/uL


(0.0-0.2)


 


Erythrocyte Sedimentation Rate 6 (0-15) 


 


Sodium Level


 139 mmol/L


(136-145)


 


Potassium Level


 3.8 mmol/L


(3.5-5.1)


 


Chloride Level


 100 mmol/L


()


 


Carbon Dioxide Level


 27 mmol/L


(21-32)


 


Anion Gap 12 (6-14) 


 


Blood Urea Nitrogen


 17 mg/dL


(8-26)


 


Creatinine


 0.9 mg/dL


(0.7-1.3)


 


Estimated GFR


(Cockcroft-Gault) 94.4 





 


BUN/Creatinine Ratio 19 (6-20) 


 


Glucose Level


 100 mg/dL


(70-99)


 


Lactic Acid Level


 1.1 mmol/L


(0.4-2.0)


 


Uric Acid


 9.7 mg/dL


(3.5-7.2)


 


Calcium Level


 8.9 mg/dL


(8.5-10.1)


 


Total Bilirubin


 0.6 mg/dL


(0.2-1.0)


 


Aspartate Amino Transf


(AST/SGOT) 23 U/L (15-37) 





 


Alanine Aminotransferase


(ALT/SGPT) 43 U/L (16-63) 





 


Alkaline Phosphatase


 125 U/L


()


 


C-Reactive Protein,


Quantitative 13.8 mg/L


(0-3.3)


 


Total Protein


 7.9 g/dL


(6.4-8.2)


 


Albumin


 4.2 g/dL


(3.4-5.0)


 


Albumin/Globulin Ratio 1.1 (1.0-1.7) 











VTE Prophylaxis Ordered


VTE Prophylaxis Devices:  No


VTE Pharmacological Prophylaxi:  Yes





Assessment/Plan


Assessment/Plan


A/P:


Intractable L Knee Pain - will treat as gout flare, was previously treated as 

septic arthritis. Consult ortho. Colchicine, prednisone, NSAIDs


Elevated CRP - likely related to gouty flare up


Unable to walk - 2/2 gout flare in left knee


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





FEN - General diet


PPX - lovenox


FULL CODE


Dispo - inpatient for intractable knee pain, unable to walk











TOY CUELLAR MD        Nov 25, 2019 17:07

## 2019-11-25 NOTE — RAD
Left lower extremity venous real time grayscale, color and spectral duplex

ultrasound was performed. 

 

History: Left lower extremity pain and edema

 

Comparison: None

 

The left common femoral, femoral, and popliteal veins   demonstrate 

anechoic lumina, full compressibility, augmentable waveforms, and cephalad

color Doppler flow.  The posterior tibial  are also patent. Normal flow is

also seen in the cephalad portion of the saphenous vein.

 

Impression: No evidence of DVT in the left lower extremity. 

 

Electronically signed by: Torito Freitas MD (11/25/2019 3:54 PM) [facility] [id number]

## 2019-11-26 VITALS — SYSTOLIC BLOOD PRESSURE: 108 MMHG | DIASTOLIC BLOOD PRESSURE: 66 MMHG

## 2019-11-26 VITALS — SYSTOLIC BLOOD PRESSURE: 104 MMHG | DIASTOLIC BLOOD PRESSURE: 71 MMHG

## 2019-11-26 VITALS — DIASTOLIC BLOOD PRESSURE: 70 MMHG | SYSTOLIC BLOOD PRESSURE: 104 MMHG

## 2019-11-26 VITALS — DIASTOLIC BLOOD PRESSURE: 73 MMHG | SYSTOLIC BLOOD PRESSURE: 111 MMHG

## 2019-11-26 VITALS — DIASTOLIC BLOOD PRESSURE: 68 MMHG | SYSTOLIC BLOOD PRESSURE: 110 MMHG

## 2019-11-26 VITALS — SYSTOLIC BLOOD PRESSURE: 106 MMHG | DIASTOLIC BLOOD PRESSURE: 70 MMHG

## 2019-11-26 RX ADMIN — INDOMETHACIN SCH MG: 25 CAPSULE ORAL at 16:43

## 2019-11-26 RX ADMIN — COLCHICINE SCH MG: 0.6 TABLET, FILM COATED ORAL at 20:31

## 2019-11-26 RX ADMIN — MORPHINE SULFATE PRN MG: 2 INJECTION, SOLUTION INTRAMUSCULAR; INTRAVENOUS at 12:13

## 2019-11-26 RX ADMIN — MORPHINE SULFATE PRN MG: 2 INJECTION, SOLUTION INTRAMUSCULAR; INTRAVENOUS at 07:15

## 2019-11-26 RX ADMIN — COLCHICINE SCH MG: 0.6 TABLET, FILM COATED ORAL at 09:59

## 2019-11-26 RX ADMIN — INDOMETHACIN SCH MG: 25 CAPSULE ORAL at 10:00

## 2019-11-26 NOTE — PDOC
[employer] PROGRESS NOTE


Chief Complaint


Chief Complaint


Left knee pain





History of Present Illness


History of Present Illness


11/26/19


Pt seen and examined while in bed.


Pt resting comfortably.





Vitals/I&O


Vitals/I&O:





                                   Vital Signs








  Date Time  Temp Pulse Resp B/P (MAP) Pulse Ox O2 Delivery O2 Flow Rate FiO2


 


11/26/19 08:46      Room Air  


 


11/26/19 07:15   20  96   


 


11/26/19 07:00 98.0 60  104/71 (82)    





 98.0       














                                    I & O   


 


 11/25/19 11/25/19 11/26/19





 14:59 22:59 06:59


 


Intake Total  1180 ml 0 ml


 


Balance  1180 ml 0 ml











Physical Exam


General:  Alert, Cooperative, No acute distress


Heart:  Regular rate, No murmurs


Lungs:  Clear, Other (No respiratory distress)


Abdomen:  Normal bowel sounds, Soft, No tenderness


Extremities:  No clubbing, No cyanosis, No edema, Normal pulses


Skin:  Other (No rashes or significant lesions on visible skin)





Labs


Labs:





Laboratory Tests








Test


 11/25/19


13:50


 


White Blood Count


 10.3 x10^3/uL


(4.0-11.0)


 


Red Blood Count


 5.08 x10^6/uL


(4.30-5.70)


 


Hemoglobin


 15.7 g/dL


(13.0-17.5)


 


Hematocrit


 45.1 %


(39.0-53.0)


 


Mean Corpuscular Volume 89 fL () 


 


Mean Corpuscular Hemoglobin 31 pg (25-35) 


 


Mean Corpuscular Hemoglobin


Concent 35 g/dL


(31-37)


 


Red Cell Distribution Width


 13.6 %


(11.5-14.5)


 


Platelet Count


 274 x10^3/uL


(140-400)


 


Neutrophils (%) (Auto) 73 % (31-73) 


 


Lymphocytes (%) (Auto) 16 % (24-48) 


 


Monocytes (%) (Auto) 9 % (0-9) 


 


Eosinophils (%) (Auto) 2 % (0-3) 


 


Basophils (%) (Auto) 1 % (0-3) 


 


Neutrophils # (Auto)


 7.5 x10^3/uL


(1.8-7.7)


 


Lymphocytes # (Auto)


 1.6 x10^3/uL


(1.0-4.8)


 


Monocytes # (Auto)


 0.9 x10^3/uL


(0.0-1.1)


 


Eosinophils # (Auto)


 0.2 x10^3/uL


(0.0-0.7)


 


Basophils # (Auto)


 0.1 x10^3/uL


(0.0-0.2)


 


Erythrocyte Sedimentation Rate 6 (0-15) 


 


Sodium Level


 139 mmol/L


(136-145)


 


Potassium Level


 3.8 mmol/L


(3.5-5.1)


 


Chloride Level


 100 mmol/L


()


 


Carbon Dioxide Level


 27 mmol/L


(21-32)


 


Anion Gap 12 (6-14) 


 


Blood Urea Nitrogen


 17 mg/dL


(8-26)


 


Creatinine


 0.9 mg/dL


(0.7-1.3)


 


Estimated GFR


(Cockcroft-Gault) 94.4 





 


BUN/Creatinine Ratio 19 (6-20) 


 


Glucose Level


 100 mg/dL


(70-99)


 


Lactic Acid Level


 1.1 mmol/L


(0.4-2.0)


 


Uric Acid


 9.7 mg/dL


(3.5-7.2)


 


Calcium Level


 8.9 mg/dL


(8.5-10.1)


 


Total Bilirubin


 0.6 mg/dL


(0.2-1.0)


 


Aspartate Amino Transf


(AST/SGOT) 23 U/L (15-37) 





 


Alanine Aminotransferase


(ALT/SGPT) 43 U/L (16-63) 





 


Alkaline Phosphatase


 125 U/L


()


 


C-Reactive Protein,


Quantitative 13.8 mg/L


(0-3.3)


 


Total Protein


 7.9 g/dL


(6.4-8.2)


 


Albumin


 4.2 g/dL


(3.4-5.0)


 


Albumin/Globulin Ratio 1.1 (1.0-1.7) 











Review of Systems


Review of Systems:


Neurologic: Denies headache


Cardiovascular: Denies chest pain





Assessment and Plan


Assessmemt and Plan


Problems


Medical Problems:


(1) Arthritis of knee, left


Status: Acute  





Intractable L Knee Pain 


Elevated CRP 


Unable to walk 


Hyperuricemia 





PLAN


- Rocephin 1 gram IV q 24 hours


- Consult orthopedics


- Labs


- PT/OT











Comment


Review of Relevant


I have reviewed the following items jimmy (where applicable) has been applied.


Medications:





Current Medications








 Medications


  (Trade)  Dose


 Ordered  Sig/Chantelle


 Route


 PRN Reason  Start Time


 Stop Time Status Last Admin


Dose Admin


 


 Lidocaine HCl  20 ml  1X  STAT


 IJ


   11/25/19 13:44


 11/25/19 13:46 DC 11/25/19 14:05





 


 Sodium Chloride  1,000 ml @ 


 1,000 mls/hr  1X  STAT


 IV


   11/25/19 13:46


 11/25/19 14:45 DC 11/25/19 14:05





 


 Morphine Sulfate


  (Morphine


 Sulfate)  5 mg  1X  STAT


 IV


   11/25/19 13:46


 11/25/19 13:51 DC 11/25/19 14:04





 


 Hydromorphone HCl


  (Dilaudid)  1 mg  1X


 IVP


   11/25/19 16:30


    11/25/19 16:28





 


 Lidocaine HCl


  (Lidocaine 1%


 20ml Vial)  20 ml  1X  STAT


 INJ


   11/25/19 16:19


 11/25/19 16:21 DC 11/25/19 16:29





 


 Indomethacin


  (Indocin)  50 mg  TID


 PO


   11/25/19 16:30


 11/25/19 17:13 DC 11/25/19 17:05





 


 Colchicine


  (Colcrys)  1.2 mg  1X  STAT


 PO


   11/25/19 16:29


 11/25/19 16:34 DC 11/25/19 17:05





 


 Colchicine


  (Colcrys)  0.6 mg  BID


 PO


   11/25/19 21:00


    11/26/19 09:59





 


 Morphine Sulfate


  (Morphine


 Sulfate)  2 mg  PRN Q2HR  PRN


 IV


 PAIN  11/25/19 17:00


 11/26/19 16:59  11/26/19 07:15





 


 Indomethacin


  (Indocin)  25 mg  BIDWMEALS


 PO


   11/26/19 08:00


    11/26/19 10:00





 


 Ketorolac


 Tromethamine


  (Toradol 30mg


 Vial)  30 mg  PRN Q6HRS  PRN


 IVP


 pain  11/25/19 17:15


 11/27/19 17:14  11/25/19 19:50





 


 Prednisone


  (Prednisone)  40 mg  DAILY


 PO


   11/25/19 17:15


    11/26/19 09:59




















HUNTER AHN III DO           Nov 26, 2019 10:30

## 2019-11-26 NOTE — PDOC2
CONSULT


Date of Consult


Date of Consult


DATE: 11/26/19 


TIME: 08:52





Reason for Consult


Reason for Consult:


Left knee pain





Referring Physician


Referring Physician:


Giacomo





Identification/Chief Complaint


Chief Complaint


Left knee pain





Source


Source:  Patient





History of Present Illness


Reason for Visit:


Patient is a 39-year-old  gentleman who was admitted to the hospital for

a similar complaint on his contralateral side recently. He tells me that over 

the past couple days his left knee has been more sore. He feels the pain in the 

back, nor else. The pain does not radiate. It is worse as with ambulation and 

maximal flexion of his knee. No recent accidents or injuries. He does feel like 

his knee is been a little stiff and may be little swollen as well. He presented 

to the emergency department and had a dry tap done. He was started on anti-

inflammatory and antigout medication he tells me this is helped his pain a lot. 

He denies any fevers or chills. History and examination conducted with the 

assistance of an  this morning.





Past Medical History


Cardiovascular:  No pertinent hx


Pulmonary:  No pertinent hx


GI:  No pertinent hx


Heme/Onc:  No pertinent hx


Hepatobiliary:  No pertinent hx


Psych:  No pertinent hx


Rheumatologic:  No pertinent hx


Infectious disease:  No pertinent hx


Renal/:  No pertinent hx


Endocrine:  No pertinent hx





Past Surgical History


Past Surgical History:  No pertinent history





Family History


Family History:  Diabetes





Social History


No


ALCOHOL:  none


Drugs:  None


Lives:  with Family





Current Problem List


Problem List


Problems


Medical Problems:


(1) Arthritis of knee, left


Status: Acute  











Current Medications


Current Medications





Current Medications


Lidocaine HCl 20 ml 1X  STAT IJ  Last administered on 11/25/19at 14:05;  Start 

11/25/19 at 13:44;  Stop 11/25/19 at 13:46;  Status DC


Sodium Chloride 1,000 ml @  1,000 mls/hr 1X  STAT IV  Last administered on 

11/25/19at 14:05;  Start 11/25/19 at 13:46;  Stop 11/25/19 at 14:45;  Status DC


Morphine Sulfate (Morphine Sulfate) 5 mg 1X  STAT IV  Last administered on 

11/25/19at 14:04;  Start 11/25/19 at 13:46;  Stop 11/25/19 at 13:51;  Status DC


Hydromorphone HCl (Dilaudid) 1 mg 1X IVP  Last administered on 11/25/19at 16:28;

 Start 11/25/19 at 16:30


Lidocaine HCl (Lidocaine 1% 20ml Vial) 20 ml 1X  STAT INJ  Last administered on 

11/25/19at 16:29;  Start 11/25/19 at 16:19;  Stop 11/25/19 at 16:21;  Status DC


Indomethacin (Indocin) 50 mg TID PO  Last administered on 11/25/19at 17:05;  

Start 11/25/19 at 16:30;  Stop 11/25/19 at 17:13;  Status DC


Colchicine (Colcrys) 1.2 mg 1X  STAT PO  Last administered on 11/25/19at 17:05; 

Start 11/25/19 at 16:29;  Stop 11/25/19 at 16:34;  Status DC


Colchicine (Colcrys) 0.6 mg BID PO  Last administered on 11/25/19at 21:37;  

Start 11/25/19 at 21:00


Ondansetron HCl (Zofran) 4 mg PRN Q8HRS  PRN IV NAUSEA/VOMITING;  Start 11/25/19

at 17:00;  Stop 11/26/19 at 16:59


Morphine Sulfate (Morphine Sulfate) 2 mg PRN Q2HR  PRN IV PAIN Last administered

on 11/26/19at 07:15;  Start 11/25/19 at 17:00;  Stop 11/26/19 at 16:59


Indomethacin (Indocin) 25 mg BIDWMEALS PO ;  Start 11/26/19 at 08:00


Ketorolac Tromethamine (Toradol 30mg Vial) 30 mg PRN Q6HRS  PRN IVP pain Last 

administered on 11/25/19at 19:50;  Start 11/25/19 at 17:15;  Stop 11/27/19 at 

17:14


Prednisone (Prednisone) 40 mg DAILY PO  Last administered on 11/25/19at 18:38;  

Start 11/25/19 at 17:15





Active Scripts


Active


Doxycycline Hyclate 100 Mg Tablet 100 Mg PO BID 10 Days


     Can sub with any Doxycycline formulation for patient cost


Cipro (Ciprofloxacin Hcl) 250 Mg Tablet 500 Mg PO BID 10 Days


     Can sub with 500mg Tabs #20


Norco 5-325 Tablet (Acetaminophen/Hydrocodone Bitart) 1 Each Tablet 1 Tab PO PRN

Q6HRS PRN 6 Days





Allergies


Allergies:  


Coded Allergies:  


     No Known Drug Allergies (Unverified , 4/20/16)





ROS


General:  No: Chills, Night Sweats, Fatigue, Malaise, Appetite, Other


PSYCHOLOGICAL ROS:  No: Anxiety, Behavioral Disorder, Concentration difficultie,

Decreased libido, Depression, Disorientation, Hallucinations, Hostility, 

Irritablity, Memory difficulties, Mood Swings, Obsessive thoughts, Physical 

abuse, Sexual abuse, Sleep disturbances, Suicidal ideation, Other


Eyes:  No Blurry vision, No Decreased vision, No Double vision, No Dry eyes, No 

Excessive tearing, No Eye Pain, No Itchy Eyes, No Loss of vision, No 

Photophobia, No Scotomata, No Uses contacts, No Uses glasses, No Other


HEENT:  No: Heacaches, Visual Changes, Hearing change, Nasal congestion, Nasal 

discharge, Oral lesions, Sinus pain, Sore Throat, Epistaxis, Sneezing, Snoring, 

Tinnitus, Vertigo, Vocal changes, Other


ALLERGY AND IMMUNOLOGY:  No: Hives, Insect Bite Sensitivity, Itchy/Watery Eyes, 

Nasal Congestion, Post Nasal Drip, Seasonal Allergies, Other


Hematological and Lymphatic:  No: Bleeding Problems, Blood Clots, Blood 

Transfusions, Brusing, Night Sweats, Pallor, Swollen Lymph Nodes, Other


ENDOCRINE:  No: Breast Changes, Galactorrhea, Hair Pattern Changes, Hot Flashes,

Malaise/lethargy, Mood Swings, Palpitations, Polydipsia/polyuria, Skin Changes, 

Temperature Intolerance, Unexpected Weight Changes, Other


Respiratory:  No: Cough, Hemoptysis, Orthopnea, Pleuritic Pain, Shortness of 

breath, SOB with excertion, Sputum Changes, Stridor, Tachypnea, Wheezing, Other


Cardiovascular:  No Chest Pain, No Palpitations, No Orthopnea, No Paroxysmal 

Noc. Dyspnea, No Edema, No Lt Headedness, No Other


Gastrointestinal:  No Nausea, No Vomiting, No Abdominal Pain, No Diarrhea, No 

Constipation, No Melena, No Hematochezia, No Other


Genitourinary:  No Dysuria, No Frequency, No Incontinence, No Hematuria, No Rete

ntion, No Discharge, No Urgency, No Pain, No Flank Pain, No Other, No , No , No 

, No , No , No , No 


Musculoskeletal:  Yes Gait Disturbance, Yes Joint Pain


Neurological:  No Behavorial Changes, No Bowel/Bladder ControlChng, No 

Confusion, No Dizziness, No Gait Disturbance, No Headaches, No Impaired 

Coord/balance, No Memory Loss, No Numbness/Tingling, No Seizures, No Speech 

Problems, No Tremors, No Visual Changes, No Weakness, No Other


Skin:  No Dry Skin, No Eczema, No Hair Changes, No Lumps, No Mole Changes, No 

Mottling, No Nail Changes, No Pruritus, No Rash, No Skin Lesion Changes, No 

Other, No Acne





Physical Exam


General:  Alert, Oriented X3


HEENT:  Atraumatic, EOMI


Lungs:  Other (respirations are unlabored with symmetric chest rise)


Heart:  Regular rate


Abdomen:  Soft, No tenderness


Extremities:  No edema, Normal pulses


Neuro:  Normal speech, Strength at 5/5 X4 ext, Sensation intact


Psych/Mental Status:  Mental status NL, Mood NL


MUSCULOSKELETAL:  Other (examination of his left lower extremity reveals a mild 

effusion in his knee. Betadine prep in place over his anterior knee and lateral 

knee. Range of motion is 0 to about 130. He is tender in his popliteal fossa. 

No joint line tenderness. No tenderness elsewhere around his knee. Normal knee 

stability is present.)





Vitals


VITALS





Vital Signs








  Date Time  Temp Pulse Resp B/P (MAP) Pulse Ox O2 Delivery O2 Flow Rate FiO2


 


11/26/19 08:46      Room Air  


 


11/26/19 07:15   20  96   


 


11/26/19 07:00 98.0 60  104/71 (82)    





 98.0       











Labs


Labs





Laboratory Tests








Test


 11/25/19


13:50


 


White Blood Count


 10.3 x10^3/uL


(4.0-11.0)


 


Red Blood Count


 5.08 x10^6/uL


(4.30-5.70)


 


Hemoglobin


 15.7 g/dL


(13.0-17.5)


 


Hematocrit


 45.1 %


(39.0-53.0)


 


Mean Corpuscular Volume 89 fL () 


 


Mean Corpuscular Hemoglobin 31 pg (25-35) 


 


Mean Corpuscular Hemoglobin


Concent 35 g/dL


(31-37)


 


Red Cell Distribution Width


 13.6 %


(11.5-14.5)


 


Platelet Count


 274 x10^3/uL


(140-400)


 


Neutrophils (%) (Auto) 73 % (31-73) 


 


Lymphocytes (%) (Auto) 16 % (24-48) 


 


Monocytes (%) (Auto) 9 % (0-9) 


 


Eosinophils (%) (Auto) 2 % (0-3) 


 


Basophils (%) (Auto) 1 % (0-3) 


 


Neutrophils # (Auto)


 7.5 x10^3/uL


(1.8-7.7)


 


Lymphocytes # (Auto)


 1.6 x10^3/uL


(1.0-4.8)


 


Monocytes # (Auto)


 0.9 x10^3/uL


(0.0-1.1)


 


Eosinophils # (Auto)


 0.2 x10^3/uL


(0.0-0.7)


 


Basophils # (Auto)


 0.1 x10^3/uL


(0.0-0.2)


 


Erythrocyte Sedimentation Rate 6 (0-15) 


 


Sodium Level


 139 mmol/L


(136-145)


 


Potassium Level


 3.8 mmol/L


(3.5-5.1)


 


Chloride Level


 100 mmol/L


()


 


Carbon Dioxide Level


 27 mmol/L


(21-32)


 


Anion Gap 12 (6-14) 


 


Blood Urea Nitrogen


 17 mg/dL


(8-26)


 


Creatinine


 0.9 mg/dL


(0.7-1.3)


 


Estimated GFR


(Cockcroft-Gault) 94.4 





 


BUN/Creatinine Ratio 19 (6-20) 


 


Glucose Level


 100 mg/dL


(70-99)


 


Lactic Acid Level


 1.1 mmol/L


(0.4-2.0)


 


Uric Acid


 9.7 mg/dL


(3.5-7.2)


 


Calcium Level


 8.9 mg/dL


(8.5-10.1)


 


Total Bilirubin


 0.6 mg/dL


(0.2-1.0)


 


Aspartate Amino Transf


(AST/SGOT) 23 U/L (15-37) 





 


Alanine Aminotransferase


(ALT/SGPT) 43 U/L (16-63) 





 


Alkaline Phosphatase


 125 U/L


()


 


C-Reactive Protein,


Quantitative 13.8 mg/L


(0-3.3)


 


Total Protein


 7.9 g/dL


(6.4-8.2)


 


Albumin


 4.2 g/dL


(3.4-5.0)


 


Albumin/Globulin Ratio 1.1 (1.0-1.7) 








Laboratory Tests








Test


 11/25/19


13:50


 


White Blood Count


 10.3 x10^3/uL


(4.0-11.0)


 


Red Blood Count


 5.08 x10^6/uL


(4.30-5.70)


 


Hemoglobin


 15.7 g/dL


(13.0-17.5)


 


Hematocrit


 45.1 %


(39.0-53.0)


 


Mean Corpuscular Volume 89 fL () 


 


Mean Corpuscular Hemoglobin 31 pg (25-35) 


 


Mean Corpuscular Hemoglobin


Concent 35 g/dL


(31-37)


 


Red Cell Distribution Width


 13.6 %


(11.5-14.5)


 


Platelet Count


 274 x10^3/uL


(140-400)


 


Neutrophils (%) (Auto) 73 % (31-73) 


 


Lymphocytes (%) (Auto) 16 % (24-48) 


 


Monocytes (%) (Auto) 9 % (0-9) 


 


Eosinophils (%) (Auto) 2 % (0-3) 


 


Basophils (%) (Auto) 1 % (0-3) 


 


Neutrophils # (Auto)


 7.5 x10^3/uL


(1.8-7.7)


 


Lymphocytes # (Auto)


 1.6 x10^3/uL


(1.0-4.8)


 


Monocytes # (Auto)


 0.9 x10^3/uL


(0.0-1.1)


 


Eosinophils # (Auto)


 0.2 x10^3/uL


(0.0-0.7)


 


Basophils # (Auto)


 0.1 x10^3/uL


(0.0-0.2)


 


Erythrocyte Sedimentation Rate 6 (0-15) 


 


Sodium Level


 139 mmol/L


(136-145)


 


Potassium Level


 3.8 mmol/L


(3.5-5.1)


 


Chloride Level


 100 mmol/L


()


 


Carbon Dioxide Level


 27 mmol/L


(21-32)


 


Anion Gap 12 (6-14) 


 


Blood Urea Nitrogen


 17 mg/dL


(8-26)


 


Creatinine


 0.9 mg/dL


(0.7-1.3)


 


Estimated GFR


(Cockcroft-Gault) 94.4 





 


BUN/Creatinine Ratio 19 (6-20) 


 


Glucose Level


 100 mg/dL


(70-99)


 


Lactic Acid Level


 1.1 mmol/L


(0.4-2.0)


 


Uric Acid


 9.7 mg/dL


(3.5-7.2)


 


Calcium Level


 8.9 mg/dL


(8.5-10.1)


 


Total Bilirubin


 0.6 mg/dL


(0.2-1.0)


 


Aspartate Amino Transf


(AST/SGOT) 23 U/L (15-37) 





 


Alanine Aminotransferase


(ALT/SGPT) 43 U/L (16-63) 





 


Alkaline Phosphatase


 125 U/L


()


 


C-Reactive Protein,


Quantitative 13.8 mg/L


(0-3.3)


 


Total Protein


 7.9 g/dL


(6.4-8.2)


 


Albumin


 4.2 g/dL


(3.4-5.0)


 


Albumin/Globulin Ratio 1.1 (1.0-1.7) 











Images


Images


X-rays were interpreted by myself. Reports reviewed. Ultrasound report was 

reviewed. No DVT, no degenerative changes or fractures seen





Assessment/Plan


Assessment/Plan


Given his good improvement from the anti-inflammatory medicine, I think this 

very likely a gout flare. I did review the prior admission information and 

culture reports which were negative. I think this most likely a gout flare. 

Patient was admitted due to inability to ambulate and for pain. We will continue

to monitor his clinical situation today.











TOBY MOSS II, MD        Nov 26, 2019 08:56

## 2019-11-26 NOTE — NUR
SW following for discharge planning. Discussed with RN, pt from home. RN advised pt will 
likely have one more day then discharge. Pt will be followed by [facility] for self pay 
status. SW will continue to follow.

## 2019-11-27 VITALS — DIASTOLIC BLOOD PRESSURE: 59 MMHG | SYSTOLIC BLOOD PRESSURE: 104 MMHG

## 2019-11-27 VITALS — SYSTOLIC BLOOD PRESSURE: 112 MMHG | DIASTOLIC BLOOD PRESSURE: 69 MMHG

## 2019-11-27 VITALS — SYSTOLIC BLOOD PRESSURE: 106 MMHG | DIASTOLIC BLOOD PRESSURE: 65 MMHG

## 2019-11-27 RX ADMIN — COLCHICINE SCH MG: 0.6 TABLET, FILM COATED ORAL at 08:50

## 2019-11-27 RX ADMIN — INDOMETHACIN SCH MG: 25 CAPSULE ORAL at 08:50

## 2019-11-27 NOTE — NUR
Discharge Note:



TRISTEN COOPER [facility]



Discharge instructions and discharge home medications reviewed with Patient and a copy 
given. All questions have been answered and understanding verbalized. 



The following instructions and handouts were given: Discharge instructions, new 
prescriptions and follow up recommendations.



Discontinued lines and drains: Peripheral IV discontinued intact.



Patient discharged to Home or Self Care with Spouse via Crutches/Walker off unit by RN.

## 2019-11-27 NOTE — PDOC
[employer] PROGRESS NOTE


Chief Complaint


Chief Complaint


Left knee pain





History of Present Illness


History of Present Illness


11/27/19


Pt seen and examined while in bed. Says pain is a 6/10. 


Pt resting comfortably. 


Talked with ortho at bedside about plan. 


Discussed case with RN.


Chart reviewed.





Vitals/I&O


Vitals/I&O:





                                   Vital Signs








  Date Time  Temp Pulse Resp B/P (MAP) Pulse Ox O2 Delivery O2 Flow Rate FiO2


 


11/27/19 11:07 98.1 60 18 104/59 (74) 100 Room Air  





 98.1       














                                    I & O   


 


 11/26/19 11/26/19 11/27/19





 15:00 23:00 07:00


 


Intake Total 600 ml 0 ml 50 ml


 


Balance 600 ml 0 ml 50 ml











Physical Exam


Physical Exam:


HEENT: nose midline, no scleral icterus


General:  Alert, Cooperative, No acute distress


Heart:  Regular rate, No murmurs


Lungs:  Clear, Other (No respiratory distress)


Abdomen:  Normal bowel sounds, Soft


Extremities:  No clubbing, No cyanosis





Review of Systems


Review of Systems:


GI: Denies abdominal pain


General: Denies fever





Assessment and Plan


Assessmemt and Plan


Problems


Medical Problems:


(1) Arthritis of knee, left


Status: Acute  





Intractable L Knee Pain 


Elevated CRP 


Unable to walk 


Hyperuricemia 





PLAN


- Discontinue antibiotics


- Medrol dose pack


- Colchicine


- Pain medication


- Start allopurinol in a week after acute symptoms resolve


- Prepare to discharge today














Comment


Review of Relevant


I have reviewed the following items jimmy (where applicable) has been applied.


Medications:





Current Medications








 Medications


  (Trade)  Dose


 Ordered  Sig/Chantelle


 Route


 PRN Reason  Start Time


 Stop Time Status Last Admin


Dose Admin


 


 Ceftriaxone Sodium


  (Rocephin)  1 gm  Q24H


 IVP


   11/26/19 12:00


    11/26/19 12:13





 


 Loperamide HCl


  (Imodium)  4 mg  1X  ONCE


 PO


   11/27/19 04:00


 11/27/19 04:01 DC 11/27/19 03:33





 


 Loperamide HCl


  (Imodium)  2 mg  PRN Q15MIN  PRN


 PO


 DIARRHEA  11/27/19 03:30


    11/27/19 08:50




















CASTLEENOCHL K III DO           Nov 27, 2019 11:19

## 2019-11-27 NOTE — SNU/HH DC
DISCHARGE WITH HOME HEALTH


DISCHARGE INFORMATION:


Final Diagnosis:


Problems


Medical Problems:


(1) Arthritis of knee, left


Status: Acute  








Condition on Discharge:  Stable





CODE STATUS:


Code Status:  Full





HOME HEALTH:


Face to Face:


I certify this patient is under my care and that I, or a nurse practitioner or 

physician's assistant working with me, had a face to face encounter that meets 

the physician face to face encounter requirements with this patient on [].


Medical Complications:  Other (foot wound)


RN For Eval/Treatment:  Yes


Physical Therapy For:  Evalulation/Treatment


Occupational Therapy For:  Evaluation/Treatment


Home Health Aide For:  Self-care


MSW For:  [employer]


Pt Meets Homebound Status:  Poor coordination w/ amb.





POST DISCHARGE ORDERS:


Activity Instructions for Disc:  Activity as tolerated, Avoid exertion


Weight Bearing Status after Di:  No restrictions


DIET AFTER DISCHARGE:  ADA





FOLLOW-UP:


Follow up with:  Dr. Pleitez as needed.


Follow Up With:  Primary Care Physician in 1-2 weeks, or as needed.





TREATMENT/EQUIPMENT ORDERS:


Adaptive Equipment Issued:  Crutches





CERTIFICATION STATEMENT:


Certification Statement:


Certification Statement: Based on the above finding, I certify that this patient

 is confined to the home and needs intermittent skilled nursing care, physical 

therapy and/or speech therapy, or continues to need occupational therapy.~ This 

patient is under my care, and I have initiated the establishment of the plan of 

care.~ This patient will be followed by myself or a community physician who will

 periodically review the plan of care.


Home Meds


Active Scripts


Doxycycline Hyclate (DOXYCYCLINE HYCLATE) 100 Mg Tablet, 100 MG PO BID for 

Septic arthritis for 10 Days, #20 TAB


   Can sub with any Doxycycline formulation for patient cost


   Prov:TOY CUELLAR MD         10/17/19


Ciprofloxacin Hcl (CIPRO) 250 Mg Tablet, 500 MG PO BID for Septic arthritis for 

10 Days, #40 TAB


   Can sub with 500mg Tabs #20


   Prov:TOY CUELLAR MD         10/17/19


Hydrocodone/Apap 5-325 (NORCO 5-325 TABLET) 1 Each Tablet, 1 TAB PO PRN Q6HRS P

RN for PAIN for 6 Days, #20 TAB 0 Refills


   Prov:TOY CUELLAR MD         10/17/19











HUNTER AHN III, DO           Nov 27, 2019 11:02

## 2019-11-27 NOTE — PDOC
ORTHO PROGRESS NOTES


Subjective


Pain better, still posterior pain. No new complaints


Vitals





Vital Signs








  Date Time  Temp Pulse Resp B/P (MAP) Pulse Ox O2 Delivery O2 Flow Rate FiO2


 


11/27/19 07:00 98.3 86 18 106/65 (79) 97 Room Air  





 98.3       








Labs





Laboratory Tests








Test


 11/25/19


13:50


 


White Blood Count


 10.3 x10^3/uL


(4.0-11.0)


 


Red Blood Count


 5.08 x10^6/uL


(4.30-5.70)


 


Hemoglobin


 15.7 g/dL


(13.0-17.5)


 


Hematocrit


 45.1 %


(39.0-53.0)


 


Mean Corpuscular Volume 89 fL () 


 


Mean Corpuscular Hemoglobin 31 pg (25-35) 


 


Mean Corpuscular Hemoglobin


Concent 35 g/dL


(31-37)


 


Red Cell Distribution Width


 13.6 %


(11.5-14.5)


 


Platelet Count


 274 x10^3/uL


(140-400)


 


Neutrophils (%) (Auto) 73 % (31-73) 


 


Lymphocytes (%) (Auto) 16 % (24-48) 


 


Monocytes (%) (Auto) 9 % (0-9) 


 


Eosinophils (%) (Auto) 2 % (0-3) 


 


Basophils (%) (Auto) 1 % (0-3) 


 


Neutrophils # (Auto)


 7.5 x10^3/uL


(1.8-7.7)


 


Lymphocytes # (Auto)


 1.6 x10^3/uL


(1.0-4.8)


 


Monocytes # (Auto)


 0.9 x10^3/uL


(0.0-1.1)


 


Eosinophils # (Auto)


 0.2 x10^3/uL


(0.0-0.7)


 


Basophils # (Auto)


 0.1 x10^3/uL


(0.0-0.2)


 


Erythrocyte Sedimentation Rate 6 (0-15) 


 


Sodium Level


 139 mmol/L


(136-145)


 


Potassium Level


 3.8 mmol/L


(3.5-5.1)


 


Chloride Level


 100 mmol/L


()


 


Carbon Dioxide Level


 27 mmol/L


(21-32)


 


Anion Gap 12 (6-14) 


 


Blood Urea Nitrogen


 17 mg/dL


(8-26)


 


Creatinine


 0.9 mg/dL


(0.7-1.3)


 


Estimated GFR


(Cockcroft-Gault) 94.4 





 


BUN/Creatinine Ratio 19 (6-20) 


 


Glucose Level


 100 mg/dL


(70-99)


 


Lactic Acid Level


 1.1 mmol/L


(0.4-2.0)


 


Uric Acid


 9.7 mg/dL


(3.5-7.2)


 


Calcium Level


 8.9 mg/dL


(8.5-10.1)


 


Total Bilirubin


 0.6 mg/dL


(0.2-1.0)


 


Aspartate Amino Transf


(AST/SGOT) 23 U/L (15-37) 





 


Alanine Aminotransferase


(ALT/SGPT) 43 U/L (16-63) 





 


Alkaline Phosphatase


 125 U/L


()


 


C-Reactive Protein,


Quantitative 13.8 mg/L


(0-3.3)


 


Total Protein


 7.9 g/dL


(6.4-8.2)


 


Albumin


 4.2 g/dL


(3.4-5.0)


 


Albumin/Globulin Ratio 1.1 (1.0-1.7) 








Notes


A and A


left knee has less erythema and effusion today; effusion minimal


Assessment and Plan


d/w danial Hernandez to D/C


my contact info given to patient and family











TOBY MOSS II, MD        Nov 27, 2019 09:55

## 2019-12-02 NOTE — DS
DATE OF DISCHARGE:  11/27/2019



ADMISSION DIAGNOSIS:  Arthritis of the left knee.



DISCHARGE DIAGNOSIS:  Resolving arthritis, suspect gout.



HOSPITAL COURSE:  The patient is a pleasant 39-year-old male who presented with

left knee that was swollen and erythematous.  We consulted Orthopedics.  They

did an arthrocentesis that showed probable gout.  We placed him on colchicine

and his symptoms improved dramatically and we discharged to home with

prescription for colchicine, Medrol Dosepak and some Lortab.  I gave him a

prescription for allopurinol.



DISPOSITION:  Home.



ACTIVITY:  As tolerated.



DIET:  Low sodium.



MEDICATIONS:  Please see the MRAD.



TOTAL TIME:  32 minutes.

 



______________________________

HUNTER AHN DO



DR:  DANIELE/emeli  JOB#:  [id number] / [id number]

DD:  12/02/2019 08:59  DT:  12/02/2019 09:29